# Patient Record
Sex: MALE | Race: WHITE | Employment: OTHER | URBAN - METROPOLITAN AREA
[De-identification: names, ages, dates, MRNs, and addresses within clinical notes are randomized per-mention and may not be internally consistent; named-entity substitution may affect disease eponyms.]

---

## 2021-05-20 ENCOUNTER — OFFICE VISIT (OUTPATIENT)
Dept: INTERNAL MEDICINE CLINIC | Facility: CLINIC | Age: 61
End: 2021-05-20
Payer: COMMERCIAL

## 2021-05-20 VITALS
WEIGHT: 231 LBS | HEIGHT: 71 IN | HEART RATE: 80 BPM | DIASTOLIC BLOOD PRESSURE: 82 MMHG | SYSTOLIC BLOOD PRESSURE: 138 MMHG | BODY MASS INDEX: 32.34 KG/M2 | OXYGEN SATURATION: 98 %

## 2021-05-20 DIAGNOSIS — I10 ESSENTIAL HYPERTENSION: Primary | ICD-10-CM

## 2021-05-20 DIAGNOSIS — Z72.0 TOBACCO ABUSE: ICD-10-CM

## 2021-05-20 DIAGNOSIS — D75.1 ERYTHROCYTOSIS: ICD-10-CM

## 2021-05-20 DIAGNOSIS — R93.3 ABNORMAL COLONOSCOPY: ICD-10-CM

## 2021-05-20 DIAGNOSIS — K57.90 DIVERTICULOSIS: ICD-10-CM

## 2021-05-20 PROCEDURE — 99204 OFFICE O/P NEW MOD 45 MIN: CPT | Performed by: INTERNAL MEDICINE

## 2021-05-20 PROCEDURE — 3079F DIAST BP 80-89 MM HG: CPT | Performed by: INTERNAL MEDICINE

## 2021-05-20 PROCEDURE — 3008F BODY MASS INDEX DOCD: CPT | Performed by: INTERNAL MEDICINE

## 2021-05-20 PROCEDURE — 3075F SYST BP GE 130 - 139MM HG: CPT | Performed by: INTERNAL MEDICINE

## 2021-05-20 RX ORDER — LISINOPRIL 20 MG/1
20 TABLET ORAL DAILY
COMMUNITY
Start: 2020-01-27 | End: 2021-05-20

## 2021-05-20 RX ORDER — LISINOPRIL 20 MG/1
20 TABLET ORAL DAILY
Qty: 90 TABLET | Refills: 0 | Status: SHIPPED | OUTPATIENT
Start: 2021-05-20 | End: 2021-08-16

## 2021-05-20 NOTE — PROGRESS NOTES
HPI:    Patient ID: Theresa Tracey is a 59year old male. Presents to the office for the first time to establish care. Patient recently moved here from [place]. In general he has been feeling fine.     Patient has history of diverticulosis, tobacc • Other (DEAFNESS) Mother    • Diabetes Maternal Grandfather    • Stroke Maternal Grandfather      Social History    Socioeconomic History      Marital status: Life Partner      Spouse name: Not on file      Number of children: Not on file      Years of SWELLING  Sulfa Antibiotics       OTHER (SEE COMMENTS)    Comment:GI UPSET  Hydrochlorothiazide     OTHER (SEE COMMENTS)    Comment:Indigestion after dinner, went away after 2 large             glasses of drinking water   PHYSICAL EXAM:   B exercise in addition to minimizing salt in diet which will also help blood pressure. Patient does have longstanding history of diverticulosis with treatment for diverticulitis in January.   Colonoscopy showed changes consistent with diverticulitis and GI

## 2021-06-28 NOTE — H&P
[facility] - Gastroenterology                                                                                                               Reason for consult: crc screening    Requesting physician or provider: Arnol Pastor MD    Patient presents side effects were discussed. Subsequent abdominal CT scan and repeat colonoscopy in a month to prove normalization of the area after medical treatment were recommended by Dr. Jasmina Syed.     He did not have repeat ct and/or cln    He has had mild abd pain in • Hypertension Mother    • Dementia Mother    • Heart Attack Mother    • Other (DEAFNESS) Mother    • Diabetes Maternal Grandfather    • Stroke Maternal Grandfather       Social History: Social History    Tobacco Use      Smoking status: Current Every Da soft, mild tenderness low abd, non-distended no rebound or guarding, no masses, no hepatomegaly  MSK: No redness, no warmth, no swelling of joints  SKIN: No jaundice, no erythema, no rashes  HEMATOLOGIC: No bleeding, no bruising  NEURO: Alert and interacti 2 polyps actually removed at approximately 20  cm, both of which were small and pedunculated. They appeared  hyperplastic in nature. The scope was then brought back to the rectum  where it was retroflexed. There was no significant lesions.  Digital  rectal low abd pain c/w previous diverticular attacks. No fever/chills. He has switched to low residue diet and is moving bowels daily w/o brbpr, melena. He denies a FHx GI malignancy.   Plan for:  -low residue diet  -report to er if condition decline  -labs  -c

## 2021-06-30 ENCOUNTER — LAB ENCOUNTER (OUTPATIENT)
Dept: LAB | Facility: HOSPITAL | Age: 61
End: 2021-06-30
Attending: NURSE PRACTITIONER
Payer: COMMERCIAL

## 2021-06-30 ENCOUNTER — OFFICE VISIT (OUTPATIENT)
Dept: GASTROENTEROLOGY | Facility: CLINIC | Age: 61
End: 2021-06-30
Payer: COMMERCIAL

## 2021-06-30 ENCOUNTER — TELEPHONE (OUTPATIENT)
Dept: GASTROENTEROLOGY | Facility: CLINIC | Age: 61
End: 2021-06-30

## 2021-06-30 VITALS
BODY MASS INDEX: 32.34 KG/M2 | HEIGHT: 71 IN | DIASTOLIC BLOOD PRESSURE: 103 MMHG | WEIGHT: 231 LBS | HEART RATE: 96 BPM | SYSTOLIC BLOOD PRESSURE: 170 MMHG

## 2021-06-30 DIAGNOSIS — I10 ESSENTIAL HYPERTENSION: ICD-10-CM

## 2021-06-30 DIAGNOSIS — K57.32 SIGMOID DIVERTICULITIS: ICD-10-CM

## 2021-06-30 DIAGNOSIS — Z86.010 PERSONAL HISTORY OF COLONIC POLYPS: ICD-10-CM

## 2021-06-30 DIAGNOSIS — K30 INDIGESTION: ICD-10-CM

## 2021-06-30 DIAGNOSIS — K57.32 SIGMOID DIVERTICULITIS: Primary | ICD-10-CM

## 2021-06-30 LAB
BASOPHILS # BLD AUTO: 0.09 X10(3) UL (ref 0–0.2)
BASOPHILS NFR BLD AUTO: 0.6 %
DEPRECATED RDW RBC AUTO: 44.2 FL (ref 35.1–46.3)
EOSINOPHIL # BLD AUTO: 0.35 X10(3) UL (ref 0–0.7)
EOSINOPHIL NFR BLD AUTO: 2.2 %
ERYTHROCYTE [DISTWIDTH] IN BLOOD BY AUTOMATED COUNT: 13.7 % (ref 11–15)
HCT VFR BLD AUTO: 49.9 %
HGB BLD-MCNC: 16.2 G/DL
IMM GRANULOCYTES # BLD AUTO: 0.11 X10(3) UL (ref 0–1)
IMM GRANULOCYTES NFR BLD: 0.7 %
LYMPHOCYTES # BLD AUTO: 2.59 X10(3) UL (ref 1–4)
LYMPHOCYTES NFR BLD AUTO: 16.1 %
MCH RBC QN AUTO: 28.3 PG (ref 26–34)
MCHC RBC AUTO-ENTMCNC: 32.5 G/DL (ref 31–37)
MCV RBC AUTO: 87.2 FL
MONOCYTES # BLD AUTO: 1.13 X10(3) UL (ref 0.1–1)
MONOCYTES NFR BLD AUTO: 7 %
NEUTROPHILS # BLD AUTO: 11.86 X10 (3) UL (ref 1.5–7.7)
NEUTROPHILS # BLD AUTO: 11.86 X10(3) UL (ref 1.5–7.7)
NEUTROPHILS NFR BLD AUTO: 73.4 %
PLATELET # BLD AUTO: 524 10(3)UL (ref 150–450)
RBC # BLD AUTO: 5.72 X10(6)UL
WBC # BLD AUTO: 16.1 X10(3) UL (ref 4–11)

## 2021-06-30 PROCEDURE — 3077F SYST BP >= 140 MM HG: CPT | Performed by: NURSE PRACTITIONER

## 2021-06-30 PROCEDURE — 3008F BODY MASS INDEX DOCD: CPT | Performed by: NURSE PRACTITIONER

## 2021-06-30 PROCEDURE — 36415 COLL VENOUS BLD VENIPUNCTURE: CPT

## 2021-06-30 PROCEDURE — 85025 COMPLETE CBC W/AUTO DIFF WBC: CPT

## 2021-06-30 PROCEDURE — 99244 OFF/OP CNSLTJ NEW/EST MOD 40: CPT | Performed by: NURSE PRACTITIONER

## 2021-06-30 PROCEDURE — 3080F DIAST BP >= 90 MM HG: CPT | Performed by: NURSE PRACTITIONER

## 2021-06-30 RX ORDER — METRONIDAZOLE 500 MG/1
500 TABLET ORAL EVERY 8 HOURS
Qty: 21 TABLET | Refills: 0 | Status: SHIPPED | OUTPATIENT
Start: 2021-06-30 | End: 2021-07-07

## 2021-06-30 RX ORDER — CIPROFLOXACIN 500 MG/1
500 TABLET, FILM COATED ORAL 2 TIMES DAILY
Qty: 14 TABLET | Refills: 0 | Status: SHIPPED | OUTPATIENT
Start: 2021-06-30 | End: 2021-07-07

## 2021-06-30 NOTE — PATIENT INSTRUCTIONS
-low residue diet  -report to er if condition decline  -labs  -ct  -plan for cln timing on dependent on ct results

## 2021-06-30 NOTE — TELEPHONE ENCOUNTER
Patient called in stating he just wanted to let Roxy Balderrama know that he got the message and he wants to say thank you!

## 2021-07-07 ENCOUNTER — HOSPITAL ENCOUNTER (OUTPATIENT)
Dept: CT IMAGING | Facility: HOSPITAL | Age: 61
Discharge: HOME | End: 2021-07-07
Attending: NURSE PRACTITIONER
Payer: COMMERCIAL

## 2021-07-07 DIAGNOSIS — K57.32 SIGMOID DIVERTICULITIS: ICD-10-CM

## 2021-07-07 LAB — CREAT BLD-MCNC: 1 MG/DL

## 2021-07-07 PROCEDURE — 74177 CT ABD & PELVIS W/CONTRAST: CPT | Performed by: NURSE PRACTITIONER

## 2021-07-07 PROCEDURE — 82565 ASSAY OF CREATININE: CPT

## 2021-07-08 ENCOUNTER — TELEPHONE (OUTPATIENT)
Dept: GASTROENTEROLOGY | Facility: CLINIC | Age: 61
End: 2021-07-08

## 2021-07-08 ENCOUNTER — HOSPITAL ENCOUNTER (INPATIENT)
Facility: HOSPITAL | Age: 61
LOS: 2 days | Discharge: HOME | DRG: 392 | End: 2021-07-10
Attending: EMERGENCY MEDICINE | Admitting: INTERNAL MEDICINE
Payer: COMMERCIAL

## 2021-07-08 DIAGNOSIS — K57.20 COLONIC DIVERTICULAR ABSCESS: Primary | ICD-10-CM

## 2021-07-08 PROBLEM — R79.89 AZOTEMIA: Status: ACTIVE | Noted: 2021-07-08

## 2021-07-08 PROBLEM — K57.92 DIVERTICULITIS: Status: ACTIVE | Noted: 2021-07-08

## 2021-07-08 LAB
ANION GAP SERPL CALC-SCNC: 5 MMOL/L (ref 0–18)
BASOPHILS # BLD AUTO: 0.09 X10(3) UL (ref 0–0.2)
BASOPHILS NFR BLD AUTO: 0.7 %
BUN BLD-MCNC: 16 MG/DL (ref 7–18)
BUN/CREAT SERPL: 20.3 (ref 10–20)
CALCIUM BLD-MCNC: 8.8 MG/DL (ref 8.5–10.1)
CHLORIDE SERPL-SCNC: 107 MMOL/L (ref 98–112)
CO2 SERPL-SCNC: 28 MMOL/L (ref 21–32)
CREAT BLD-MCNC: 0.79 MG/DL
DEPRECATED RDW RBC AUTO: 42.6 FL (ref 35.1–46.3)
EOSINOPHIL # BLD AUTO: 0.45 X10(3) UL (ref 0–0.7)
EOSINOPHIL NFR BLD AUTO: 3.3 %
ERYTHROCYTE [DISTWIDTH] IN BLOOD BY AUTOMATED COUNT: 13.7 % (ref 11–15)
GLUCOSE BLD-MCNC: 94 MG/DL (ref 70–99)
HCT VFR BLD AUTO: 45.8 %
HGB BLD-MCNC: 15 G/DL
IMM GRANULOCYTES # BLD AUTO: 0.14 X10(3) UL (ref 0–1)
IMM GRANULOCYTES NFR BLD: 1 %
LYMPHOCYTES # BLD AUTO: 3.41 X10(3) UL (ref 1–4)
LYMPHOCYTES NFR BLD AUTO: 25.1 %
MCH RBC QN AUTO: 27.9 PG (ref 26–34)
MCHC RBC AUTO-ENTMCNC: 32.8 G/DL (ref 31–37)
MCV RBC AUTO: 85.1 FL
MONOCYTES # BLD AUTO: 0.99 X10(3) UL (ref 0.1–1)
MONOCYTES NFR BLD AUTO: 7.3 %
NEUTROPHILS # BLD AUTO: 8.52 X10 (3) UL (ref 1.5–7.7)
NEUTROPHILS # BLD AUTO: 8.52 X10(3) UL (ref 1.5–7.7)
NEUTROPHILS NFR BLD AUTO: 62.6 %
OSMOLALITY SERPL CALC.SUM OF ELEC: 291 MOSM/KG (ref 275–295)
PLATELET # BLD AUTO: 546 10(3)UL (ref 150–450)
POTASSIUM SERPL-SCNC: 4.4 MMOL/L (ref 3.5–5.1)
RBC # BLD AUTO: 5.38 X10(6)UL
SODIUM SERPL-SCNC: 140 MMOL/L (ref 136–145)
WBC # BLD AUTO: 13.6 X10(3) UL (ref 4–11)

## 2021-07-08 PROCEDURE — 99254 IP/OBS CNSLTJ NEW/EST MOD 60: CPT | Performed by: PHYSICIAN ASSISTANT

## 2021-07-08 RX ORDER — ONDANSETRON 2 MG/ML
4 INJECTION INTRAMUSCULAR; INTRAVENOUS EVERY 6 HOURS PRN
Status: DISCONTINUED | OUTPATIENT
Start: 2021-07-08 | End: 2021-07-10

## 2021-07-08 RX ORDER — MORPHINE SULFATE 2 MG/ML
2 INJECTION, SOLUTION INTRAMUSCULAR; INTRAVENOUS EVERY 2 HOUR PRN
Status: DISCONTINUED | OUTPATIENT
Start: 2021-07-08 | End: 2021-07-10

## 2021-07-08 RX ORDER — MELATONIN
3 NIGHTLY PRN
Status: DISCONTINUED | OUTPATIENT
Start: 2021-07-08 | End: 2021-07-10

## 2021-07-08 RX ORDER — PROCHLORPERAZINE EDISYLATE 5 MG/ML
5 INJECTION INTRAMUSCULAR; INTRAVENOUS EVERY 8 HOURS PRN
Status: DISCONTINUED | OUTPATIENT
Start: 2021-07-08 | End: 2021-07-10

## 2021-07-08 RX ORDER — HYDROCODONE BITARTRATE AND ACETAMINOPHEN 5; 325 MG/1; MG/1
1 TABLET ORAL EVERY 4 HOURS PRN
Status: DISCONTINUED | OUTPATIENT
Start: 2021-07-08 | End: 2021-07-10

## 2021-07-08 RX ORDER — LISINOPRIL 20 MG/1
20 TABLET ORAL NIGHTLY
Status: DISCONTINUED | OUTPATIENT
Start: 2021-07-08 | End: 2021-07-10

## 2021-07-08 RX ORDER — DEXTROSE, SODIUM CHLORIDE, AND POTASSIUM CHLORIDE 5; .45; .15 G/100ML; G/100ML; G/100ML
INJECTION INTRAVENOUS CONTINUOUS
Status: DISCONTINUED | OUTPATIENT
Start: 2021-07-08 | End: 2021-07-10

## 2021-07-08 RX ORDER — HYDROCODONE BITARTRATE AND ACETAMINOPHEN 5; 325 MG/1; MG/1
2 TABLET ORAL EVERY 4 HOURS PRN
Status: DISCONTINUED | OUTPATIENT
Start: 2021-07-08 | End: 2021-07-10

## 2021-07-08 RX ORDER — MORPHINE SULFATE 2 MG/ML
1 INJECTION, SOLUTION INTRAMUSCULAR; INTRAVENOUS EVERY 2 HOUR PRN
Status: DISCONTINUED | OUTPATIENT
Start: 2021-07-08 | End: 2021-07-10

## 2021-07-08 RX ORDER — ACETAMINOPHEN 325 MG/1
650 TABLET ORAL EVERY 4 HOURS PRN
Status: DISCONTINUED | OUTPATIENT
Start: 2021-07-08 | End: 2021-07-10

## 2021-07-08 RX ORDER — MORPHINE SULFATE 4 MG/ML
4 INJECTION, SOLUTION INTRAMUSCULAR; INTRAVENOUS EVERY 2 HOUR PRN
Status: DISCONTINUED | OUTPATIENT
Start: 2021-07-08 | End: 2021-07-10

## 2021-07-08 RX ORDER — SODIUM CHLORIDE 9 MG/ML
INJECTION, SOLUTION INTRAVENOUS CONTINUOUS
Status: ACTIVE | OUTPATIENT
Start: 2021-07-08 | End: 2021-07-08

## 2021-07-08 NOTE — CONSULTS
[facility]   Gastroenterology Consultation Note    Solo Lundberg  Patient Status:    Emergency  Date of Admission:         7/8/2021, Hospital day #0  Attending:   Ric Ashley MD  PCP:     Wm Torres MD    Reason for Cons pathologies    Endoscopy Hx:  - CLN 1/2010: 2 polyps removed and path c/w pre-cancerous type. CLN 1/29/20:    PROCEDURE: Colonoscopy with polypectomy. SURGEON: Rambo Mesa.  Dave Wilkins MD    ANESTHESIA: Sedation, analgesia with 100 mg of Demerol, 5 mg of V There was no significant lesions. Digital  rectal exam was done and revealed good sphincter tone. No masses or  abnormalities palpated. FOLLOW-UP: He will follow-up next week to get the biopsy results. Fern Ybarra MD     DIAGNOSIS           \" prior EGD    Social Hx:  - reports that he quit tobacco use ten years ago however per notes, currently smoking 1 pack per week   - No ETOH reported to me, however previous reports indicate 3 glasses of wine/week  - Denies illicit drug use  - Lives with par dysuria or gross hematuria  INTEGUMENT/BREAST:  SKIN:  negative for jaundice or new rash   ALLERGIC/IMMUNOLOGIC:  negative for hay fever   ENDOCRINE:  negative for cold intolerance and heat intolerance  MUSCULOSKELETAL:  negative for joint effusion/severe the presence of this fluid collection and diverticulitis rather than primary appendicitis. Segments of the distal ileum also are in contact with this region of fluid.   2.  Fat density focus in the distal duodenum which could represent incidental luminal c

## 2021-07-08 NOTE — ED PROVIDER NOTES
Patient Seen in: [facility] Emergency Department      History   Patient presents with:  Diverticulitis  Abnormal Result    Stated Complaint: iv abx for diverticulitis     HPI/Subjective:   HPI    Patient presents to the emergency department with a 10. 98\")   Wt 104.8 kg   SpO2 98%   BMI 32.24 kg/m²         Physical Exam  Vitals and nursing note reviewed. Constitutional:       General: He is not in acute distress. Appearance: He is well-developed. HENT:      Head: Normocephalic.    Eyes: RAINBOW DRAW LAVENDER   RAINBOW DRAW LIGHT GREEN   RAINBOW DRAW GOLD                   MDM      Pulse Ox: 98%, Normal,       Radiology findings: CT reviewed from yesterday.   Radiology exams  Viewed and reviewed by myself and findings discussed with randa

## 2021-07-08 NOTE — ED INITIAL ASSESSMENT (HPI)
On abx for diverticulitis x 10 days with continued pain had OP CT with possible early abscess formation.

## 2021-07-08 NOTE — TELEPHONE ENCOUNTER
CT A/P 7/7/21:  Impression  CONCLUSION:   1.  There is history of sigmoid diverticulitis.  No comparison imaging is available at this institution. Costa Cox is a 12 cm segment of inflamed mid-distal sigmoid colon, compatible with ongoing diverticulitis. Merritt Ruiz Hyperplastic polyp. B) Colon, descending, biopsy: Benign colonic mucosa, submucosal tissue not seen. C) Colon, sigmoid, biopsy: Benign colonic mucosa with a lymphoid aggregate.  D) Colon, sigmoid, biopsies: Benign colonic mucosa with active chronic inflamma

## 2021-07-08 NOTE — ED QUICK NOTES
Orders for admission, patient is aware of plan and ready to go upstairs.  Any questions, please call ED RN Rafe Dakins  at extension [phone]  Type of COVID test sent:None-pt vaccinated  COVID Suspicion level: Low    Titratable drug(s) infusing: SL  Rate:    LOC at

## 2021-07-09 LAB
ALBUMIN SERPL-MCNC: 2 G/DL (ref 3.4–5)
ALBUMIN/GLOB SERPL: 0.5 {RATIO} (ref 1–2)
ALP LIVER SERPL-CCNC: 70 U/L
ALT SERPL-CCNC: 28 U/L
ANION GAP SERPL CALC-SCNC: 5 MMOL/L (ref 0–18)
AST SERPL-CCNC: 20 U/L (ref 15–37)
BASOPHILS # BLD AUTO: 0.09 X10(3) UL (ref 0–0.2)
BASOPHILS NFR BLD AUTO: 0.8 %
BILIRUB SERPL-MCNC: 0.2 MG/DL (ref 0.1–2)
BUN BLD-MCNC: 14 MG/DL (ref 7–18)
BUN/CREAT SERPL: 17.7 (ref 10–20)
CALCIUM BLD-MCNC: 8.7 MG/DL (ref 8.5–10.1)
CHLORIDE SERPL-SCNC: 108 MMOL/L (ref 98–112)
CO2 SERPL-SCNC: 27 MMOL/L (ref 21–32)
CREAT BLD-MCNC: 0.79 MG/DL
DEPRECATED RDW RBC AUTO: 42.6 FL (ref 35.1–46.3)
EOSINOPHIL # BLD AUTO: 0.47 X10(3) UL (ref 0–0.7)
EOSINOPHIL NFR BLD AUTO: 4.4 %
ERYTHROCYTE [DISTWIDTH] IN BLOOD BY AUTOMATED COUNT: 13.5 % (ref 11–15)
GLOBULIN PLAS-MCNC: 4.1 G/DL (ref 2.8–4.4)
GLUCOSE BLD-MCNC: 116 MG/DL (ref 70–99)
HCT VFR BLD AUTO: 43.6 %
HGB BLD-MCNC: 14.2 G/DL
IMM GRANULOCYTES # BLD AUTO: 0.07 X10(3) UL (ref 0–1)
IMM GRANULOCYTES NFR BLD: 0.7 %
LYMPHOCYTES # BLD AUTO: 2.37 X10(3) UL (ref 1–4)
LYMPHOCYTES NFR BLD AUTO: 22.4 %
M PROTEIN MFR SERPL ELPH: 6.1 G/DL (ref 6.4–8.2)
MCH RBC QN AUTO: 28 PG (ref 26–34)
MCHC RBC AUTO-ENTMCNC: 32.6 G/DL (ref 31–37)
MCV RBC AUTO: 85.8 FL
MONOCYTES # BLD AUTO: 0.85 X10(3) UL (ref 0.1–1)
MONOCYTES NFR BLD AUTO: 8 %
NEUTROPHILS # BLD AUTO: 6.74 X10 (3) UL (ref 1.5–7.7)
NEUTROPHILS # BLD AUTO: 6.74 X10(3) UL (ref 1.5–7.7)
NEUTROPHILS NFR BLD AUTO: 63.7 %
OSMOLALITY SERPL CALC.SUM OF ELEC: 291 MOSM/KG (ref 275–295)
PLATELET # BLD AUTO: 467 10(3)UL (ref 150–450)
POTASSIUM SERPL-SCNC: 4.6 MMOL/L (ref 3.5–5.1)
RBC # BLD AUTO: 5.08 X10(6)UL
SODIUM SERPL-SCNC: 140 MMOL/L (ref 136–145)
WBC # BLD AUTO: 10.6 X10(3) UL (ref 4–11)

## 2021-07-09 PROCEDURE — 99233 SBSQ HOSP IP/OBS HIGH 50: CPT | Performed by: INTERNAL MEDICINE

## 2021-07-09 PROCEDURE — 99222 1ST HOSP IP/OBS MODERATE 55: CPT | Performed by: INTERNAL MEDICINE

## 2021-07-09 RX ORDER — AMLODIPINE BESYLATE 5 MG/1
5 TABLET ORAL DAILY
Status: DISCONTINUED | OUTPATIENT
Start: 2021-07-09 | End: 2021-07-10

## 2021-07-09 NOTE — H&P
[facility]    History and Physical    Ruthie Maza Patient Status:  Inpatient    1960 MRN [id number]   Location [facility] 4W/SW/SE Attending Oneida Rubinstein, MD   Hosp Day # 1 PCP Leif Narayanan MD     Date:  2021 • Heart Disorder Father    • Hypertension Father    • Other (CAD) Father    • Hypertension Mother    • Dementia Mother    • Heart Attack Mother    • Other (DEAFNESS) Mother    • Diabetes Maternal Grandfather    • Stroke Maternal Grandfather      Social H Constitutional:       Appearance: He is not ill-appearing. HENT:      Head: Normocephalic. Nose: No congestion. Eyes:      Extraocular Movements: Extraocular movements intact. Cardiovascular:      Rate and Rhythm: Normal rate.       Heart sound suggesting phlegmonous change and/or early abscess formation. This could also represent site of a contained perforation.   The tip of the appendix is also adjacent to this region of fluid, and the tip of the appendix is inflamed which is likely secondary t above    Patient understands and agrees with plan.         Robe Chacko MD  7/9/2021

## 2021-07-09 NOTE — IMAGING NOTE
CT scan reviewed - sigmoid diverticulitis with a small area of phlegmonous fluid which is not amenable to drain placement.

## 2021-07-09 NOTE — PLAN OF CARE
Problem: Patient Centered Care  Goal: Patient preferences are identified and integrated in the patient's plan of care  Description: Interventions:  - What would you like us to know as we care for you?   - Provide timely, complete, and accurate informatio deficits and behaviors that affect risk of falls.   - [place] fall precautions as indicated by assessment.  - Educate pt/family on patient safety including physical limitations  - Instruct pt to call for assistance with activity based on assessment  - Mod

## 2021-07-09 NOTE — PROGRESS NOTES
[facility]     Gastroenterology Progress Note    Jose Hayes Patient Status:  Inpatient    1960 MRN [id number]   Location [facility] 4W/SW/SE Attending Magnolia Vaz MD   Hosp Day # 1 PCP Zully Gutierrez MD antibiotics until CT demonstrates resolution of abscess/phlegmon  -- last colonoscopy 4/2021 at [facility], complete to cecum per report. Demonstrated area of diverticulitis in sigmoid which likely correlates to recurrent findings.  No intraluminal maligna

## 2021-07-10 VITALS
BODY MASS INDEX: 31.36 KG/M2 | SYSTOLIC BLOOD PRESSURE: 172 MMHG | HEIGHT: 71 IN | OXYGEN SATURATION: 96 % | RESPIRATION RATE: 20 BRPM | DIASTOLIC BLOOD PRESSURE: 96 MMHG | HEART RATE: 76 BPM | WEIGHT: 224 LBS | TEMPERATURE: 98 F

## 2021-07-10 LAB
ANION GAP SERPL CALC-SCNC: 3 MMOL/L (ref 0–18)
BASOPHILS # BLD AUTO: 0.08 X10(3) UL (ref 0–0.2)
BASOPHILS NFR BLD AUTO: 0.7 %
BUN BLD-MCNC: 9 MG/DL (ref 7–18)
BUN/CREAT SERPL: 11 (ref 10–20)
CALCIUM BLD-MCNC: 9.3 MG/DL (ref 8.5–10.1)
CHLORIDE SERPL-SCNC: 106 MMOL/L (ref 98–112)
CO2 SERPL-SCNC: 29 MMOL/L (ref 21–32)
CREAT BLD-MCNC: 0.82 MG/DL
DEPRECATED RDW RBC AUTO: 43.1 FL (ref 35.1–46.3)
EOSINOPHIL # BLD AUTO: 0.4 X10(3) UL (ref 0–0.7)
EOSINOPHIL NFR BLD AUTO: 3.7 %
ERYTHROCYTE [DISTWIDTH] IN BLOOD BY AUTOMATED COUNT: 13.6 % (ref 11–15)
GLUCOSE BLD-MCNC: 113 MG/DL (ref 70–99)
HCT VFR BLD AUTO: 48.2 %
HGB BLD-MCNC: 15.8 G/DL
IMM GRANULOCYTES # BLD AUTO: 0.09 X10(3) UL (ref 0–1)
IMM GRANULOCYTES NFR BLD: 0.8 %
LYMPHOCYTES # BLD AUTO: 2.27 X10(3) UL (ref 1–4)
LYMPHOCYTES NFR BLD AUTO: 20.9 %
MCH RBC QN AUTO: 28.3 PG (ref 26–34)
MCHC RBC AUTO-ENTMCNC: 32.8 G/DL (ref 31–37)
MCV RBC AUTO: 86.2 FL
MONOCYTES # BLD AUTO: 0.69 X10(3) UL (ref 0.1–1)
MONOCYTES NFR BLD AUTO: 6.4 %
NEUTROPHILS # BLD AUTO: 7.32 X10 (3) UL (ref 1.5–7.7)
NEUTROPHILS # BLD AUTO: 7.32 X10(3) UL (ref 1.5–7.7)
NEUTROPHILS NFR BLD AUTO: 67.5 %
OSMOLALITY SERPL CALC.SUM OF ELEC: 285 MOSM/KG (ref 275–295)
PLATELET # BLD AUTO: 538 10(3)UL (ref 150–450)
POTASSIUM SERPL-SCNC: 4.6 MMOL/L (ref 3.5–5.1)
RBC # BLD AUTO: 5.59 X10(6)UL
SODIUM SERPL-SCNC: 138 MMOL/L (ref 136–145)
WBC # BLD AUTO: 10.9 X10(3) UL (ref 4–11)

## 2021-07-10 PROCEDURE — 99232 SBSQ HOSP IP/OBS MODERATE 35: CPT | Performed by: INTERNAL MEDICINE

## 2021-07-10 PROCEDURE — 99239 HOSP IP/OBS DSCHRG MGMT >30: CPT | Performed by: INTERNAL MEDICINE

## 2021-07-10 RX ORDER — AMLODIPINE BESYLATE 10 MG/1
10 TABLET ORAL DAILY
Qty: 30 TABLET | Refills: 6 | Status: SHIPPED | OUTPATIENT
Start: 2021-07-11 | End: 2022-01-30

## 2021-07-10 RX ORDER — AMLODIPINE BESYLATE 10 MG/1
10 TABLET ORAL DAILY
Status: DISCONTINUED | OUTPATIENT
Start: 2021-07-10 | End: 2021-07-10

## 2021-07-10 RX ORDER — AMOXICILLIN AND CLAVULANATE POTASSIUM 875; 125 MG/1; MG/1
875 TABLET, FILM COATED ORAL EVERY 12 HOURS SCHEDULED
Qty: 42 TABLET | Refills: 0 | Status: SHIPPED | OUTPATIENT
Start: 2021-07-10 | End: 2021-07-31

## 2021-07-10 RX ORDER — AMOXICILLIN AND CLAVULANATE POTASSIUM 875; 125 MG/1; MG/1
875 TABLET, FILM COATED ORAL EVERY 12 HOURS SCHEDULED
Status: DISCONTINUED | OUTPATIENT
Start: 2021-07-10 | End: 2021-07-10

## 2021-07-10 NOTE — PROGRESS NOTES
[facility]    Progress Note    Syed Lindo Patient Status:  Inpatient    1960 MRN [id number]   Location [facility] 4W/SW/SE Attending Dearanaid Mcguire MD   Hosp Day # 2 PCP Ivonne Bee MD     Subjective:   Carlo Kelly improved, will plan on discharge home later today. Patient will follow up with me in about 5 days at which time we will recheck CBC and arrange for follow-up CT and surgical evaluation  We will also reassess blood pressure at that time.     Medications per

## 2021-07-10 NOTE — PLAN OF CARE
Plan of care reviewed with Vipul Gutierrez. Patient denies pain. IV antibiotics infusing. Ambulating independently in hallway and room. Blood pressure elevated after norvasc was started this afternoon.  Dr. Jenna House made aware, and order to give lisinopril early this

## 2021-07-10 NOTE — DISCHARGE SUMMARY
[facility]    Discharge Summary    Veena Howard Patient Status:  Inpatient    1960 MRN [id number]   Location [facility] 4W/SW/SE Attending Tucker Cisse MD   Hosp Day # 2 PCP Migdalia Calabrese MD     Date of Admission: pain      Discharge Condition: Stable         Discharge Medications:      Discharge Medications      START taking these medications      Instructions Prescription details   amLODIPine Besylate 10 MG Tabs  Commonly known as: NORVASC  Start taking on: July 1

## 2021-07-10 NOTE — PLAN OF CARE
Problem: Patient Centered Care  Goal: Patient preferences are identified and integrated in the patient's plan of care  Description: Interventions:  - What would you like us to know as we care for you?   - Provide timely, complete, and accurate informatio affect risk of falls.   - [place] fall precautions as indicated by assessment.  - Educate pt/family on patient safety including physical limitations  - Instruct pt to call for assistance with activity based on assessment  - Modify environment to reduce ri

## 2021-07-10 NOTE — PROGRESS NOTES
[facility]     Gastroenterology Progress Note    Carlitos Souza Patient Status:  Inpatient    1960 MRN [id number]   Location [facility] 4W/SW/SE Attending Austin Mayes MD   Hosp Day # 2 PCP Jaz Quiroga MD cecum per report. Demonstrated area of diverticulitis in sigmoid which likely correlates to recurrent findings. No intraluminal malignancy seen. No plan to repeat c-scope at this time.        Results:     Lab Results   Component Value Date    WBC 10.9 07/1

## 2021-07-10 NOTE — PLAN OF CARE
Patient alert and oriented, vital signs stable on room air, ambulating independently, voiding freely, passing gas, tolerating diet.  Patient cleared for discharge, received last dose of Merrem and first dose of Augmentin prior to leaving to insure no allerg pre-medicate as appropriate  Outcome: Adequate for Discharge     Problem: SAFETY ADULT - FALL  Goal: Free from fall injury  Description: INTERVENTIONS:  - Assess pt frequently for physical needs  - Identify cognitive and physical deficits and behaviors gema

## 2021-07-15 ENCOUNTER — OFFICE VISIT (OUTPATIENT)
Dept: INTERNAL MEDICINE CLINIC | Facility: CLINIC | Age: 61
End: 2021-07-15
Payer: COMMERCIAL

## 2021-07-15 ENCOUNTER — LAB ENCOUNTER (OUTPATIENT)
Dept: LAB | Age: 61
End: 2021-07-15
Attending: INTERNAL MEDICINE
Payer: COMMERCIAL

## 2021-07-15 VITALS
OXYGEN SATURATION: 98 % | DIASTOLIC BLOOD PRESSURE: 88 MMHG | SYSTOLIC BLOOD PRESSURE: 132 MMHG | BODY MASS INDEX: 31.78 KG/M2 | HEART RATE: 88 BPM | WEIGHT: 227 LBS | TEMPERATURE: 99 F | HEIGHT: 71 IN

## 2021-07-15 DIAGNOSIS — K57.92 ACUTE DIVERTICULITIS: Primary | ICD-10-CM

## 2021-07-15 DIAGNOSIS — K57.92 ACUTE DIVERTICULITIS: ICD-10-CM

## 2021-07-15 DIAGNOSIS — I10 ESSENTIAL HYPERTENSION: ICD-10-CM

## 2021-07-15 LAB
ANION GAP SERPL CALC-SCNC: 4 MMOL/L (ref 0–18)
BASOPHILS # BLD AUTO: 0.14 X10(3) UL (ref 0–0.2)
BASOPHILS NFR BLD AUTO: 1.3 %
BUN BLD-MCNC: 14 MG/DL (ref 7–18)
BUN/CREAT SERPL: 17.5 (ref 10–20)
CALCIUM BLD-MCNC: 9.5 MG/DL (ref 8.5–10.1)
CHLORIDE SERPL-SCNC: 107 MMOL/L (ref 98–112)
CO2 SERPL-SCNC: 27 MMOL/L (ref 21–32)
CREAT BLD-MCNC: 0.8 MG/DL
DEPRECATED RDW RBC AUTO: 45.2 FL (ref 35.1–46.3)
EOSINOPHIL # BLD AUTO: 0.25 X10(3) UL (ref 0–0.7)
EOSINOPHIL NFR BLD AUTO: 2.3 %
ERYTHROCYTE [DISTWIDTH] IN BLOOD BY AUTOMATED COUNT: 14.2 % (ref 11–15)
GLUCOSE BLD-MCNC: 78 MG/DL (ref 70–99)
HCT VFR BLD AUTO: 51.9 %
HGB BLD-MCNC: 16.7 G/DL
IMM GRANULOCYTES # BLD AUTO: 0.07 X10(3) UL (ref 0–1)
IMM GRANULOCYTES NFR BLD: 0.6 %
LYMPHOCYTES # BLD AUTO: 2.52 X10(3) UL (ref 1–4)
LYMPHOCYTES NFR BLD AUTO: 23.2 %
MCH RBC QN AUTO: 27.9 PG (ref 26–34)
MCHC RBC AUTO-ENTMCNC: 32.2 G/DL (ref 31–37)
MCV RBC AUTO: 86.6 FL
MONOCYTES # BLD AUTO: 0.71 X10(3) UL (ref 0.1–1)
MONOCYTES NFR BLD AUTO: 6.5 %
NEUTROPHILS # BLD AUTO: 7.19 X10 (3) UL (ref 1.5–7.7)
NEUTROPHILS # BLD AUTO: 7.19 X10(3) UL (ref 1.5–7.7)
NEUTROPHILS NFR BLD AUTO: 66.1 %
OSMOLALITY SERPL CALC.SUM OF ELEC: 285 MOSM/KG (ref 275–295)
PATIENT FASTING Y/N/NP: NO
PLATELET # BLD AUTO: 474 10(3)UL (ref 150–450)
POTASSIUM SERPL-SCNC: 4.9 MMOL/L (ref 3.5–5.1)
RBC # BLD AUTO: 5.99 X10(6)UL
SODIUM SERPL-SCNC: 138 MMOL/L (ref 136–145)
WBC # BLD AUTO: 10.9 X10(3) UL (ref 4–11)

## 2021-07-15 PROCEDURE — 3079F DIAST BP 80-89 MM HG: CPT | Performed by: INTERNAL MEDICINE

## 2021-07-15 PROCEDURE — 99214 OFFICE O/P EST MOD 30 MIN: CPT | Performed by: INTERNAL MEDICINE

## 2021-07-15 PROCEDURE — 85025 COMPLETE CBC W/AUTO DIFF WBC: CPT

## 2021-07-15 PROCEDURE — 3075F SYST BP GE 130 - 139MM HG: CPT | Performed by: INTERNAL MEDICINE

## 2021-07-15 PROCEDURE — 3008F BODY MASS INDEX DOCD: CPT | Performed by: INTERNAL MEDICINE

## 2021-07-15 PROCEDURE — 80048 BASIC METABOLIC PNL TOTAL CA: CPT

## 2021-07-15 PROCEDURE — 36415 COLL VENOUS BLD VENIPUNCTURE: CPT

## 2021-07-15 NOTE — PROGRESS NOTES
HPI:    Patient ID: Cheryl Shah is a 59year old male. Presents for hospital f/u. Patient admitted to [place] on July 8 and discharged July 10 after being admitted for further evaluation of acute diverticulitis.   Patient had initially been placed abdominal distention, abdominal pain, blood in stool, constipation, diarrhea and nausea. Genitourinary: Negative for dysuria. Neurological: Negative for dizziness, light-headedness and headaches. Psychiatric/Behavioral: Negative for agitation.  The pa Psychiatric:         Mood and Affect: Mood normal.                ASSESSMENT/PLAN:   Acute diverticulitis  (primary encounter diagnosis)  Essential hypertension    Previous hospitalization and diagnostic test results reviewed and discussed with patient.

## 2021-07-29 ENCOUNTER — HOSPITAL ENCOUNTER (OUTPATIENT)
Dept: CT IMAGING | Facility: HOSPITAL | Age: 61
Discharge: HOME | End: 2021-07-29
Attending: INTERNAL MEDICINE
Payer: COMMERCIAL

## 2021-07-29 DIAGNOSIS — K57.92 ACUTE DIVERTICULITIS: ICD-10-CM

## 2021-07-29 PROCEDURE — 74177 CT ABD & PELVIS W/CONTRAST: CPT | Performed by: INTERNAL MEDICINE

## 2021-08-16 RX ORDER — LISINOPRIL 20 MG/1
TABLET ORAL
Qty: 90 TABLET | Refills: 3 | Status: SHIPPED | OUTPATIENT
Start: 2021-08-16

## 2021-08-24 ENCOUNTER — HOSPITAL ENCOUNTER (OUTPATIENT)
Dept: CT IMAGING | Facility: HOSPITAL | Age: 61
Discharge: HOME | End: 2021-08-24
Attending: INTERNAL MEDICINE
Payer: COMMERCIAL

## 2021-08-24 DIAGNOSIS — R91.1 LUNG NODULE: ICD-10-CM

## 2021-08-24 LAB — CREAT BLD-MCNC: 0.9 MG/DL

## 2021-08-24 PROCEDURE — 82565 ASSAY OF CREATININE: CPT

## 2021-08-24 PROCEDURE — 71260 CT THORAX DX C+: CPT | Performed by: INTERNAL MEDICINE

## 2021-08-25 ENCOUNTER — PATIENT MESSAGE (OUTPATIENT)
Dept: INTERNAL MEDICINE CLINIC | Facility: CLINIC | Age: 61
End: 2021-08-25

## 2021-08-26 NOTE — TELEPHONE ENCOUNTER
From: Jose Hayes  To: Zully Gutierrez MD  Sent: 8/25/2021 8:12 PM CDT  Subject: Test Results Question    Dr. Libby aVzquez: Thank you for not only the update on my lung scan, but for the overall care you continue to provide me.     I will schedule the PET

## 2021-08-31 NOTE — TELEPHONE ENCOUNTER
PET scan was ordered 8/25/21. I confirmed with [facility] that authorization is needed. mychart sent.

## 2021-08-31 NOTE — TELEPHONE ENCOUNTER
Pt is going to call [facility] scheduling for a Pet Scan  Does this need to be authorized through his insurance?   Tasked to nursing

## 2021-09-10 NOTE — TELEPHONE ENCOUNTER
Good AFternoon Dr James Selby and staff,    PET Scan has been authorized on patient behalf.     I will contact patient to advise approved & that he can schedule test.

## 2021-09-16 ENCOUNTER — TELEPHONE (OUTPATIENT)
Dept: INTERNAL MEDICINE CLINIC | Facility: CLINIC | Age: 61
End: 2021-09-16

## 2021-09-16 NOTE — TELEPHONE ENCOUNTER
To Dr. Clint Bryant to please advise. .. Guadalupe Riedel PET scan total body dx: lung nodule scheduled for 9/30/21.

## 2021-09-16 NOTE — TELEPHONE ENCOUNTER
Pt. Is calling to clarify what type of PET scan is being done. initially he was supposed to have a lung PET scan when he checked in it showed up as a brain PET scan, then a whole body PET scan showed up in his mychart . Pt.  Rescheduled for  9/30 he doesn'

## 2021-09-30 ENCOUNTER — HOSPITAL ENCOUNTER (OUTPATIENT)
Dept: NUCLEAR MEDICINE | Facility: HOSPITAL | Age: 61
Discharge: HOME | End: 2021-09-30
Attending: INTERNAL MEDICINE
Payer: COMMERCIAL

## 2021-09-30 ENCOUNTER — HOSPITAL ENCOUNTER (OUTPATIENT)
Dept: NUCLEAR MEDICINE | Facility: HOSPITAL | Age: 61
End: 2021-09-30
Attending: INTERNAL MEDICINE
Payer: COMMERCIAL

## 2021-09-30 DIAGNOSIS — R91.1 LUNG NODULE: ICD-10-CM

## 2021-09-30 PROCEDURE — 82962 GLUCOSE BLOOD TEST: CPT

## 2021-09-30 PROCEDURE — 78815 PET IMAGE W/CT SKULL-THIGH: CPT | Performed by: INTERNAL MEDICINE

## 2022-01-30 RX ORDER — AMLODIPINE BESYLATE 10 MG/1
TABLET ORAL
Qty: 90 TABLET | Refills: 1 | Status: SHIPPED | OUTPATIENT
Start: 2022-01-30

## 2022-01-30 NOTE — TELEPHONE ENCOUNTER
Requested Prescriptions     Signed Prescriptions Disp Refills   • AMLODIPINE 10 MG Oral Tab 90 tablet 1     Sig: TAKE 1 TABLET(10 MG) BY MOUTH DAILY     Authorizing Provider: Lefty Hernandez     Ordering User: Prem Antunez

## 2022-02-09 ENCOUNTER — OFFICE VISIT (OUTPATIENT)
Dept: INTERNAL MEDICINE CLINIC | Facility: CLINIC | Age: 62
End: 2022-02-09
Payer: COMMERCIAL

## 2022-02-09 ENCOUNTER — LAB ENCOUNTER (OUTPATIENT)
Dept: LAB | Age: 62
End: 2022-02-09
Attending: INTERNAL MEDICINE
Payer: COMMERCIAL

## 2022-02-09 VITALS
BODY MASS INDEX: 32.62 KG/M2 | DIASTOLIC BLOOD PRESSURE: 80 MMHG | SYSTOLIC BLOOD PRESSURE: 136 MMHG | HEART RATE: 90 BPM | HEIGHT: 71 IN | TEMPERATURE: 98 F | WEIGHT: 233 LBS | OXYGEN SATURATION: 98 %

## 2022-02-09 DIAGNOSIS — Z00.00 PHYSICAL EXAM: Primary | ICD-10-CM

## 2022-02-09 DIAGNOSIS — Z72.0 TOBACCO ABUSE: ICD-10-CM

## 2022-02-09 DIAGNOSIS — Z00.00 PHYSICAL EXAM: ICD-10-CM

## 2022-02-09 DIAGNOSIS — I10 ESSENTIAL HYPERTENSION: ICD-10-CM

## 2022-02-09 DIAGNOSIS — K57.90 DIVERTICULOSIS: ICD-10-CM

## 2022-02-09 LAB
ALBUMIN SERPL-MCNC: 3.1 G/DL (ref 3.4–5)
ALBUMIN/GLOB SERPL: 0.9 {RATIO} (ref 1–2)
ALP LIVER SERPL-CCNC: 73 U/L
ALT SERPL-CCNC: 26 U/L
ANION GAP SERPL CALC-SCNC: 5 MMOL/L (ref 0–18)
AST SERPL-CCNC: 21 U/L (ref 15–37)
BASOPHILS # BLD AUTO: 0.1 X10(3) UL (ref 0–0.2)
BASOPHILS NFR BLD AUTO: 1.2 %
BILIRUB SERPL-MCNC: 0.3 MG/DL (ref 0.1–2)
BILIRUB UR QL: NEGATIVE
BUN BLD-MCNC: 20 MG/DL (ref 7–18)
BUN/CREAT SERPL: 22.2 (ref 10–20)
CALCIUM BLD-MCNC: 9.5 MG/DL (ref 8.5–10.1)
CHLORIDE SERPL-SCNC: 107 MMOL/L (ref 98–112)
CHOLEST SERPL-MCNC: 260 MG/DL (ref ?–200)
CO2 SERPL-SCNC: 26 MMOL/L (ref 21–32)
COLOR UR: YELLOW
COMPLEXED PSA SERPL-MCNC: 0.79 NG/ML (ref ?–4)
CREAT BLD-MCNC: 0.9 MG/DL
DEPRECATED RDW RBC AUTO: 46.1 FL (ref 35.1–46.3)
EOSINOPHIL # BLD AUTO: 0.33 X10(3) UL (ref 0–0.7)
EOSINOPHIL NFR BLD AUTO: 3.9 %
ERYTHROCYTE [DISTWIDTH] IN BLOOD BY AUTOMATED COUNT: 14.7 % (ref 11–15)
FASTING PATIENT LIPID ANSWER: NO
FASTING STATUS PATIENT QL REPORTED: NO
GLOBULIN PLAS-MCNC: 3.4 G/DL (ref 2.8–4.4)
GLUCOSE BLD-MCNC: 101 MG/DL (ref 70–99)
GLUCOSE UR-MCNC: NEGATIVE MG/DL
HCT VFR BLD AUTO: 55 %
HDLC SERPL-MCNC: 40 MG/DL (ref 40–59)
HGB BLD-MCNC: 18 G/DL
IMM GRANULOCYTES # BLD AUTO: 0.06 X10(3) UL (ref 0–1)
IMM GRANULOCYTES NFR BLD: 0.7 %
KETONES UR-MCNC: NEGATIVE MG/DL
LDLC SERPL CALC-MCNC: 182 MG/DL (ref ?–100)
LEUKOCYTE ESTERASE UR QL STRIP.AUTO: NEGATIVE
LYMPHOCYTES # BLD AUTO: 3.18 X10(3) UL (ref 1–4)
LYMPHOCYTES NFR BLD AUTO: 37.7 %
MCH RBC QN AUTO: 28.3 PG (ref 26–34)
MCHC RBC AUTO-ENTMCNC: 32.7 G/DL (ref 31–37)
MCV RBC AUTO: 86.5 FL
MONOCYTES # BLD AUTO: 0.79 X10(3) UL (ref 0.1–1)
MONOCYTES NFR BLD AUTO: 9.4 %
NEUTROPHILS # BLD AUTO: 3.97 X10 (3) UL (ref 1.5–7.7)
NEUTROPHILS # BLD AUTO: 3.97 X10(3) UL (ref 1.5–7.7)
NEUTROPHILS NFR BLD AUTO: 47.1 %
NITRITE UR QL STRIP.AUTO: NEGATIVE
NONHDLC SERPL-MCNC: 220 MG/DL (ref ?–130)
OSMOLALITY SERPL CALC.SUM OF ELEC: 289 MOSM/KG (ref 275–295)
PH UR: 5 [PH] (ref 5–8)
PLATELET # BLD AUTO: 452 10(3)UL (ref 150–450)
POTASSIUM SERPL-SCNC: 5.1 MMOL/L (ref 3.5–5.1)
PROT SERPL-MCNC: 6.5 G/DL (ref 6.4–8.2)
PROT UR-MCNC: >=500 MG/DL
RBC # BLD AUTO: 6.36 X10(6)UL
SODIUM SERPL-SCNC: 138 MMOL/L (ref 136–145)
SP GR UR STRIP: 1.03 (ref 1–1.03)
TRIGL SERPL-MCNC: 199 MG/DL (ref 30–149)
UROBILINOGEN UR STRIP-ACNC: <2
VLDLC SERPL CALC-MCNC: 41 MG/DL (ref 0–30)
WBC # BLD AUTO: 8.4 X10(3) UL (ref 4–11)

## 2022-02-09 PROCEDURE — 99396 PREV VISIT EST AGE 40-64: CPT | Performed by: INTERNAL MEDICINE

## 2022-02-09 PROCEDURE — 3079F DIAST BP 80-89 MM HG: CPT | Performed by: INTERNAL MEDICINE

## 2022-02-09 PROCEDURE — 3075F SYST BP GE 130 - 139MM HG: CPT | Performed by: INTERNAL MEDICINE

## 2022-02-09 PROCEDURE — 93000 ELECTROCARDIOGRAM COMPLETE: CPT | Performed by: INTERNAL MEDICINE

## 2022-02-09 PROCEDURE — 84443 ASSAY THYROID STIM HORMONE: CPT

## 2022-02-09 PROCEDURE — 36415 COLL VENOUS BLD VENIPUNCTURE: CPT

## 2022-02-09 PROCEDURE — 85025 COMPLETE CBC W/AUTO DIFF WBC: CPT

## 2022-02-09 PROCEDURE — 80061 LIPID PANEL: CPT

## 2022-02-09 PROCEDURE — 80053 COMPREHEN METABOLIC PANEL: CPT

## 2022-02-09 PROCEDURE — 3008F BODY MASS INDEX DOCD: CPT | Performed by: INTERNAL MEDICINE

## 2022-02-09 PROCEDURE — 85060 BLOOD SMEAR INTERPRETATION: CPT

## 2022-02-09 PROCEDURE — 81001 URINALYSIS AUTO W/SCOPE: CPT | Performed by: INTERNAL MEDICINE

## 2022-02-09 RX ORDER — VARENICLINE TARTRATE 0.5 (11)-1
0.5 KIT ORAL 2 TIMES DAILY
Qty: 1 EACH | Refills: 2 | Status: SHIPPED | OUTPATIENT
Start: 2022-02-09 | End: 2022-02-25

## 2022-02-10 ENCOUNTER — TELEPHONE (OUTPATIENT)
Dept: INTERNAL MEDICINE CLINIC | Facility: CLINIC | Age: 62
End: 2022-02-10

## 2022-02-10 RX ORDER — ATORVASTATIN CALCIUM 10 MG/1
10 TABLET, FILM COATED ORAL NIGHTLY
Qty: 90 TABLET | Refills: 3 | Status: SHIPPED | OUTPATIENT
Start: 2022-02-10

## 2022-02-10 NOTE — TELEPHONE ENCOUNTER
I spoke with [facility] lab. Patient will be able to  his collection supplies from a lab location. He will collect every urine for a 24 hour period and drop off the specimen at a lab location. No other instructions were given. I spoke with patient and relayed this to him. He verbalized understanding. He would like to do his  and his drop off to the [facility] lab. To Dr. Hudson Sample just Elliot Zhao. Patient will drop off his specimen on Tuesday.

## 2022-02-10 NOTE — TELEPHONE ENCOUNTER
Patient is calling regarding the Protien, 24 HR Urine order. Patient will pick the kit up tomorrow in the lab. On Monday 1/14/22 at 1:30 pm he will start the test  On Tuesday 1/15/22 at 1:30 pm he will stop, and take it to the lab.     Is this the correct instructions for the test?    Please call patient [phone]

## 2022-02-15 ENCOUNTER — LAB ENCOUNTER (OUTPATIENT)
Dept: LAB | Age: 62
End: 2022-02-15
Attending: INTERNAL MEDICINE
Payer: COMMERCIAL

## 2022-02-15 ENCOUNTER — OFFICE VISIT (OUTPATIENT)
Dept: HEMATOLOGY/ONCOLOGY | Facility: HOSPITAL | Age: 62
End: 2022-02-15
Attending: INTERNAL MEDICINE
Payer: COMMERCIAL

## 2022-02-15 VITALS
WEIGHT: 238 LBS | DIASTOLIC BLOOD PRESSURE: 87 MMHG | HEART RATE: 104 BPM | BODY MASS INDEX: 33.32 KG/M2 | HEIGHT: 71 IN | OXYGEN SATURATION: 97 % | RESPIRATION RATE: 18 BRPM | SYSTOLIC BLOOD PRESSURE: 136 MMHG | TEMPERATURE: 99 F

## 2022-02-15 DIAGNOSIS — R91.1 PULMONARY NODULE: Primary | ICD-10-CM

## 2022-02-15 DIAGNOSIS — D75.1 ERYTHROCYTOSIS: ICD-10-CM

## 2022-02-15 DIAGNOSIS — R82.90 ABNORMAL URINALYSIS: ICD-10-CM

## 2022-02-15 LAB
M PROTEIN 24H UR ELPH-MRATE: ABNORMAL MG/24 HR (ref ?–149.1)
SPECIMEN VOL UR: 3500 ML

## 2022-02-15 PROCEDURE — 99244 OFF/OP CNSLTJ NEW/EST MOD 40: CPT | Performed by: INTERNAL MEDICINE

## 2022-02-15 PROCEDURE — 84156 ASSAY OF PROTEIN URINE: CPT

## 2022-02-15 NOTE — TELEPHONE ENCOUNTER
Patient calling very concerned about Protein 24 Hour Urine results. Viewed results on [facility], patient states \"numbers are off the chart. \"   Leaving tomorrow for [employer]. Would like call back today.     Best call back number [phone]

## 2022-02-16 ENCOUNTER — TELEPHONE (OUTPATIENT)
Dept: INTERNAL MEDICINE CLINIC | Facility: CLINIC | Age: 62
End: 2022-02-16

## 2022-02-16 NOTE — TELEPHONE ENCOUNTER
Pt asked that Dr Markel Portillo please call him  He spoke with Dr Be Randle and she has scheduled pt for an appt on Friday, 2/18/22  Pt said she feels that surgery with Dr Geri Shine for next Wednesday is necessary and would like to be sure it is not cancelled as she feels that what is going on with colon is affecting the kidney   Does patient need to call Dr Geri Shine office or will Dr Markel Portillo?   Please call pt to discuss further  Tasked to nursing

## 2022-02-16 NOTE — CONSULTS
[facility]    PATIENT'S NAME: Catherine TYSON   CONSULTING PHYSICIAN: Jill Bojorquez. Zaira Zelaya MD   PATIENT ACCOUNT #: [de-identified] LOCATION: [address] [place] [address] RECORD #: [id number] YOB: 1960   CONSULTATION DATE: 02/15/2022       [facility] NEW PATIENT CONSULT    REQUESTING PHYSICIAN:  Anum Martinez MD.    REASON FOR CONSULTATION:  Erythrocytosis. HISTORY OF PRESENT ILLNESS:  Patient is a very pleasant 29-year-old male, current smoker, being evaluated by Hematology for erythrocytosis. He also has a history of a right middle lobe pulmonary nodule. The patient had a CBC through his primary care physician 02/09/2022 where his hemoglobin was found to be 18.0, his platelet count was 284,736 and white blood cell count 8000. He had a hemoglobin of 17.4 at [facility] January 2021. Further labs in our system, including July 2021 show hemoglobin of 16.7, platelets 196,187, white blood cell count 10,000. With regard to his pulmonary nodule, he had a CT of the chest 08/24/2021 that showed a 0.9 cm right middle lobe nodule. He underwent a PET/CT 09/30/2021 and the nodule did not have any hypermetabolic activity. He has a surgery upcoming for diverticulitis. He states he is otherwise feeling reasonably well overall. He denies any fevers or chills. He does state he works out regularly and notes he was fasting at time of his most recent blood work. He denies any focal neurological deficits. He denies any history of stroke or venous thromboembolic disease. He is otherwise without complaints. PAST MEDICAL HISTORY:  Hypertension, diverticulitis, hyperlipidemia, smoking, hemorrhoids. MEDICATIONS:  Amlodipine, atorvastatin, lisinopril, metoclopramide, metronidazole, Chantix. ALLERGIES:  Sulfa, aspirin, hydrochlorothiazide. SOCIAL HISTORY:  Admits to smoking, trying to quit. Denies any significant alcohol use. Denies illicit drug use.     FAMILY MEDICAL HISTORY:  No history of any hematologic disorders in the family, specifically mother and father. REVIEW OF SYSTEMS:  All other systems reviewed and negative x12. PHYSICAL EXAMINATION:    GENERAL:  No acute distress. Alert and oriented. VITAL SIGNS:  ECOG performance status is 0. Weight is 107.9 kg. Blood pressure is 136/87, pulse 90, respiratory rate 16, pulse ox 97% on room air. HEENT:  Moist mucous membranes. Oropharynx clear. NECK:  Supple. LUNGS:  Symmetric expansion, nonlabored breathing. HEART:  Good distal pulses. ABDOMEN:  Soft, nontender, nondistended. No masses. EXTREMITIES:  No edema. SKIN:  No visible or palpable lesions. LYMPHATICS:  No visible adenopathy. NEUROLOGIC:  Moving all extremities. Cranial nerves intact. PSYCHIATRIC:  Appropriate mood, appropriate affect. MUSCULOSKELETAL:  No deformity    LABORATORY DATA:  As per HPI. IMAGING:  As per HPI. ASSESSMENT AND PLAN:  The patient is a very pleasant 64-year-old male, current smoker, being evaluated by Hematology for erythrocytosis. We reviewed the patient's lab work over the last several years. His most recent hemoglobin has increased. We suspect this is either secondary erythrocytosis or secondary to volume depletion and hemoconcentration at the time of his most recent lab draw. He has had thrombocytosis at times also, however. We will repeat his lab work in several weeks and if he continues to have erythrocytosis, we will check an erythropoietin level to help differentiate the primary versus secondary process. From our standpoint, it is okay to proceed with his upcoming colon surgery. We did review his findings of a pulmonary nodule seen on imaging in August 2021; this was not hypermetabolic. However, given the patient's smoking history, we would advise a followup CT and we set this up for return to clinic in the spring.     Thank you very much for the consultation request and for allowing us to participate in the care of this delightful patient. Dictated By Cherilyn Ny Storm Blizzard, MD  d: 02/15/2022 19:03:56  t: 02/16/2022 01:49:15  [facility] [id number]  [facility]/    cc: MD Christina Kyle.  Edil Meadows MD

## 2022-02-18 ENCOUNTER — LAB ENCOUNTER (OUTPATIENT)
Dept: LAB | Age: 62
End: 2022-02-18
Attending: INTERNAL MEDICINE
Payer: COMMERCIAL

## 2022-02-18 DIAGNOSIS — R91.1 PULMONARY NODULE: ICD-10-CM

## 2022-02-18 DIAGNOSIS — N04.9 NEPHROTIC SYNDROME: ICD-10-CM

## 2022-02-18 DIAGNOSIS — D75.1 ERYTHROCYTOSIS: ICD-10-CM

## 2022-02-18 LAB
BASOPHILS # BLD AUTO: 0.08 X10(3) UL (ref 0–0.2)
BASOPHILS NFR BLD AUTO: 0.8 %
C3 SERPL-MCNC: 134 MG/DL (ref 90–180)
C4 SERPL-MCNC: 25.4 MG/DL (ref 10–40)
DEPRECATED RDW RBC AUTO: 46.2 FL (ref 35.1–46.3)
EOSINOPHIL # BLD AUTO: 0.16 X10(3) UL (ref 0–0.7)
EOSINOPHIL NFR BLD AUTO: 1.5 %
ERYTHROCYTE [DISTWIDTH] IN BLOOD BY AUTOMATED COUNT: 14.5 % (ref 11–15)
HCT VFR BLD AUTO: 53.1 %
HGB BLD-MCNC: 17.6 G/DL
IMM GRANULOCYTES # BLD AUTO: 0.04 X10(3) UL (ref 0–1)
IMM GRANULOCYTES NFR BLD: 0.4 %
LYMPHOCYTES # BLD AUTO: 2.5 X10(3) UL (ref 1–4)
LYMPHOCYTES NFR BLD AUTO: 23.6 %
MCH RBC QN AUTO: 28.9 PG (ref 26–34)
MCHC RBC AUTO-ENTMCNC: 33.1 G/DL (ref 31–37)
MCV RBC AUTO: 87 FL
MONOCYTES # BLD AUTO: 0.83 X10(3) UL (ref 0.1–1)
MONOCYTES NFR BLD AUTO: 7.8 %
NEUTROPHILS # BLD AUTO: 7 X10 (3) UL (ref 1.5–7.7)
NEUTROPHILS # BLD AUTO: 7 X10(3) UL (ref 1.5–7.7)
NEUTROPHILS NFR BLD AUTO: 65.9 %
PLATELET # BLD AUTO: 464 10(3)UL (ref 150–450)
RBC # BLD AUTO: 6.1 X10(6)UL
WBC # BLD AUTO: 10.6 X10(3) UL (ref 4–11)

## 2022-02-18 PROCEDURE — 85025 COMPLETE CBC W/AUTO DIFF WBC: CPT

## 2022-02-18 PROCEDURE — 86334 IMMUNOFIX E-PHORESIS SERUM: CPT

## 2022-02-18 PROCEDURE — 86160 COMPLEMENT ANTIGEN: CPT

## 2022-02-18 PROCEDURE — 86036 ANCA SCREEN EACH ANTIBODY: CPT

## 2022-02-18 PROCEDURE — 86255 FLUORESCENT ANTIBODY SCREEN: CPT

## 2022-02-18 PROCEDURE — 36415 COLL VENOUS BLD VENIPUNCTURE: CPT

## 2022-02-18 PROCEDURE — 86225 DNA ANTIBODY NATIVE: CPT

## 2022-02-18 PROCEDURE — 83516 IMMUNOASSAY NONANTIBODY: CPT

## 2022-02-18 PROCEDURE — 86038 ANTINUCLEAR ANTIBODIES: CPT

## 2022-02-21 ENCOUNTER — LAB ENCOUNTER (OUTPATIENT)
Dept: LAB | Facility: HOSPITAL | Age: 62
End: 2022-02-21
Attending: SURGERY
Payer: COMMERCIAL

## 2022-02-21 DIAGNOSIS — Z01.818 PREOP TESTING: ICD-10-CM

## 2022-02-21 LAB
ANTIBODY SCREEN: NEGATIVE
NUCLEAR IGG TITR SER IF: NEGATIVE {TITER}
RH BLOOD TYPE: POSITIVE
RH BLOOD TYPE: POSITIVE
SARS-COV-2 RNA RESP QL NAA+PROBE: NOT DETECTED

## 2022-02-21 PROCEDURE — 86900 BLOOD TYPING SEROLOGIC ABO: CPT

## 2022-02-21 PROCEDURE — 86901 BLOOD TYPING SEROLOGIC RH(D): CPT

## 2022-02-21 PROCEDURE — 86850 RBC ANTIBODY SCREEN: CPT

## 2022-02-21 PROCEDURE — 36415 COLL VENOUS BLD VENIPUNCTURE: CPT

## 2022-02-22 ENCOUNTER — LAB REQUISITION (OUTPATIENT)
Dept: SURGERY | Age: 62
End: 2022-02-22
Payer: COMMERCIAL

## 2022-02-22 ENCOUNTER — TELEPHONE (OUTPATIENT)
Dept: INTERNAL MEDICINE CLINIC | Facility: CLINIC | Age: 62
End: 2022-02-22

## 2022-02-22 ENCOUNTER — SURGERY CENTER DOCUMENTATION (OUTPATIENT)
Dept: SURGERY | Age: 62
End: 2022-02-22

## 2022-02-22 LAB
DSDNA AB TITR SER: <10 {TITER}
MYELOPEROX ANTIBODIES, IGG: 1 AU/ML
SERINE PROTEASE 3, IGG: 0 AU/ML

## 2022-02-22 PROCEDURE — 88305 TISSUE EXAM BY PATHOLOGIST: CPT | Performed by: SURGERY

## 2022-02-22 NOTE — PROCEDURES
[facility] SURGICAL CENTER OPERATIVE REPORT:     PATIENT NAME: Hiro Fuchs  : 1960   MRN: [id number]    DATE OF OPERATION:   22    PREOPERATIVE DIAGNOSIS: Recurrent diverticulitis, preop exam     POSTOPERATIVE DIAGNOSIS: Pancolonic diverticulosis and Polyp(s) of sigmoid colon at 24 cm, mass of sigmoid at 28 cm     PROCEDURE PERFORMED: Flexible sigmoidoscopy to 60 cm, snare polypectomy sigmoid polyp at 24 cm and biopsy of sigmoid mass at 28 cm, ink tatooing     SURGEON:  Mirna Becerril MD    ANESTHESIA: none    Bowel prep was excellent  Aronchick bowel prep scale:  5 Inadequate (repeat preparation needed)  4 Poor (semisolid stool could not be suctioned and <90% of mucosa seen)  3 Fair (semisolid stool could not be suctioned, but >90% of mucosa seen)  2 Good (clear liquid covering up to 25% of mucosa, but >90% of mucosa seen)  1 Excellent (>95% of mucosa seen)    ESTIMATED BLOOD LOSS:   1 ml    The patient understood the indications, details and potential risks and complications including bleeding, infection, perforation, need for repeat colonoscopy or surgery, etc. The patient consented to the procedure. The patient was brought to the endoscopy suite and placed in left lateral position. The colonoscope was advanced up to the descending colon with moderate difficulty. Findings include:  1. Diverticulosis of sigmoid and descending colon  2. Mass of sigmoid involving approximately 1/3 to 1/2 circumference - cold forceps biopsy. Spot ink tattooing done just distal to tumor. 3. Large 1 cm sigmoid polyp at 24 cm - hot snare polypectomy  4.  Normal rectum    Recommendations: proceed with surgery    Repeat colonoscopy: 6 months after surgery pending pathology report         Mirna Becerril MD  2022

## 2022-02-22 NOTE — TELEPHONE ENCOUNTER
Spoke with Amarilys Singh in [address] [facility],[address]. She states she was able to see Dr. Oneil Leggett note that patient is cleared to proceed with surgery. She does not need anything further at this time--she states she was just returning the call.

## 2022-02-22 NOTE — TELEPHONE ENCOUNTER
Yes, patient does not need kidney biopsy at this time. He is medically cleared to proceed with surgery.

## 2022-02-22 NOTE — PAT NURSING NOTE
NAYELI Moeller at Dr [facility] at [phone] re: Nephrology clearance. ( Pls see Dr Tatiana Concepcion notes under 24 hr Urine collection results on 2/15/2022). LM to Jazmyne to f/u and fax clearance to PAT.

## 2022-02-22 NOTE — TELEPHONE ENCOUNTER
Dov Yee / Preadmission Testing [facility] is calling patient is scheduled for surgery on 2/23/22. Calling to clarify information before surgery. Notes from 2/15 state there is no need for a biopsy, is this accurate? Dr Yong Prado said she will not do clearance and Dr Jose David Lewis is aware.     Please call Dov Yee [phone]

## 2022-02-23 ENCOUNTER — HOSPITAL ENCOUNTER (INPATIENT)
Facility: HOSPITAL | Age: 62
LOS: 2 days | Discharge: HOME | DRG: 330 | End: 2022-02-25
Attending: SURGERY | Admitting: SURGERY
Payer: COMMERCIAL

## 2022-02-23 ENCOUNTER — ANESTHESIA EVENT (OUTPATIENT)
Dept: SURGERY | Facility: HOSPITAL | Age: 62
DRG: 330 | End: 2022-02-23
Payer: COMMERCIAL

## 2022-02-23 ENCOUNTER — ANESTHESIA (OUTPATIENT)
Dept: SURGERY | Facility: HOSPITAL | Age: 62
DRG: 330 | End: 2022-02-23
Payer: COMMERCIAL

## 2022-02-23 DIAGNOSIS — Z01.818 PREOP TESTING: Primary | ICD-10-CM

## 2022-02-23 DIAGNOSIS — N04.9 NEPHROTIC SYNDROME: ICD-10-CM

## 2022-02-23 DIAGNOSIS — K57.32 DIVERTICULITIS OF COLON: ICD-10-CM

## 2022-02-23 PROBLEM — Z87.19 HISTORY OF DIVERTICULITIS: Status: ACTIVE | Noted: 2022-02-23

## 2022-02-23 LAB
GLUCOSE BLDC GLUCOMTR-MCNC: 105 MG/DL (ref 70–99)
GLUCOSE BLDC GLUCOMTR-MCNC: 199 MG/DL (ref 70–99)

## 2022-02-23 PROCEDURE — 3E0T3BZ INTRODUCTION OF ANESTHETIC AGENT INTO PERIPHERAL NERVES AND PLEXI, PERCUTANEOUS APPROACH: ICD-10-PCS | Performed by: SURGERY

## 2022-02-23 PROCEDURE — 0DTJ4ZZ RESECTION OF APPENDIX, PERCUTANEOUS ENDOSCOPIC APPROACH: ICD-10-PCS | Performed by: SURGERY

## 2022-02-23 PROCEDURE — 0DTG4ZZ RESECTION OF LEFT LARGE INTESTINE, PERCUTANEOUS ENDOSCOPIC APPROACH: ICD-10-PCS | Performed by: SURGERY

## 2022-02-23 PROCEDURE — 0DSL4ZZ REPOSITION TRANSVERSE COLON, PERCUTANEOUS ENDOSCOPIC APPROACH: ICD-10-PCS | Performed by: SURGERY

## 2022-02-23 RX ORDER — METRONIDAZOLE 500 MG/100ML
500 INJECTION, SOLUTION INTRAVENOUS ONCE
Status: COMPLETED | OUTPATIENT
Start: 2022-02-23 | End: 2022-02-23

## 2022-02-23 RX ORDER — ATORVASTATIN CALCIUM 10 MG/1
10 TABLET, FILM COATED ORAL NIGHTLY
Status: DISCONTINUED | OUTPATIENT
Start: 2022-02-23 | End: 2022-02-25

## 2022-02-23 RX ORDER — SODIUM CHLORIDE 9 MG/ML
INJECTION, SOLUTION INTRAVENOUS CONTINUOUS PRN
Status: DISCONTINUED | OUTPATIENT
Start: 2022-02-23 | End: 2022-02-23 | Stop reason: SURG

## 2022-02-23 RX ORDER — ACETAMINOPHEN 500 MG
1000 TABLET ORAL EVERY 8 HOURS SCHEDULED
Status: DISCONTINUED | OUTPATIENT
Start: 2022-02-23 | End: 2022-02-25

## 2022-02-23 RX ORDER — MORPHINE SULFATE 10 MG/ML
6 INJECTION, SOLUTION INTRAMUSCULAR; INTRAVENOUS EVERY 10 MIN PRN
Status: DISCONTINUED | OUTPATIENT
Start: 2022-02-23 | End: 2022-02-23 | Stop reason: HOSPADM

## 2022-02-23 RX ORDER — FAMOTIDINE 10 MG/ML
20 INJECTION, SOLUTION INTRAVENOUS 2 TIMES DAILY
Status: DISCONTINUED | OUTPATIENT
Start: 2022-02-23 | End: 2022-02-24

## 2022-02-23 RX ORDER — SODIUM CHLORIDE, SODIUM LACTATE, POTASSIUM CHLORIDE, CALCIUM CHLORIDE 600; 310; 30; 20 MG/100ML; MG/100ML; MG/100ML; MG/100ML
INJECTION, SOLUTION INTRAVENOUS CONTINUOUS
Status: DISCONTINUED | OUTPATIENT
Start: 2022-02-23 | End: 2022-02-23

## 2022-02-23 RX ORDER — TRAMADOL HYDROCHLORIDE 50 MG/1
50 TABLET ORAL EVERY 6 HOURS PRN
Status: DISCONTINUED | OUTPATIENT
Start: 2022-02-23 | End: 2022-02-25

## 2022-02-23 RX ORDER — ONDANSETRON 2 MG/ML
4 INJECTION INTRAMUSCULAR; INTRAVENOUS ONCE AS NEEDED
Status: DISCONTINUED | OUTPATIENT
Start: 2022-02-23 | End: 2022-02-23 | Stop reason: HOSPADM

## 2022-02-23 RX ORDER — AMLODIPINE BESYLATE 10 MG/1
10 TABLET ORAL NIGHTLY
Status: DISCONTINUED | OUTPATIENT
Start: 2022-02-23 | End: 2022-02-25

## 2022-02-23 RX ORDER — CEFAZOLIN SODIUM/WATER 2 G/20 ML
2 SYRINGE (ML) INTRAVENOUS EVERY 8 HOURS
Status: COMPLETED | OUTPATIENT
Start: 2022-02-23 | End: 2022-02-24

## 2022-02-23 RX ORDER — LIDOCAINE HYDROCHLORIDE 10 MG/ML
INJECTION, SOLUTION EPIDURAL; INFILTRATION; INTRACAUDAL; PERINEURAL AS NEEDED
Status: DISCONTINUED | OUTPATIENT
Start: 2022-02-23 | End: 2022-02-23 | Stop reason: SURG

## 2022-02-23 RX ORDER — HYDROMORPHONE HYDROCHLORIDE 1 MG/ML
0.4 INJECTION, SOLUTION INTRAMUSCULAR; INTRAVENOUS; SUBCUTANEOUS
Status: DISCONTINUED | OUTPATIENT
Start: 2022-02-23 | End: 2022-02-25

## 2022-02-23 RX ORDER — METOCLOPRAMIDE 10 MG/1
10 TABLET ORAL ONCE
Status: COMPLETED | OUTPATIENT
Start: 2022-02-23 | End: 2022-02-23

## 2022-02-23 RX ORDER — HYDROMORPHONE HYDROCHLORIDE 1 MG/ML
0.2 INJECTION, SOLUTION INTRAMUSCULAR; INTRAVENOUS; SUBCUTANEOUS EVERY 5 MIN PRN
Status: DISCONTINUED | OUTPATIENT
Start: 2022-02-23 | End: 2022-02-23 | Stop reason: HOSPADM

## 2022-02-23 RX ORDER — SODIUM CHLORIDE, SODIUM LACTATE, POTASSIUM CHLORIDE, CALCIUM CHLORIDE 600; 310; 30; 20 MG/100ML; MG/100ML; MG/100ML; MG/100ML
INJECTION, SOLUTION INTRAVENOUS CONTINUOUS
Status: DISCONTINUED | OUTPATIENT
Start: 2022-02-23 | End: 2022-02-23 | Stop reason: HOSPADM

## 2022-02-23 RX ORDER — ALVIMOPAN 12 MG/1
12 CAPSULE ORAL 2 TIMES DAILY
Status: DISCONTINUED | OUTPATIENT
Start: 2022-02-24 | End: 2022-02-24

## 2022-02-23 RX ORDER — HYDROMORPHONE HYDROCHLORIDE 1 MG/ML
0.4 INJECTION, SOLUTION INTRAMUSCULAR; INTRAVENOUS; SUBCUTANEOUS EVERY 5 MIN PRN
Status: DISCONTINUED | OUTPATIENT
Start: 2022-02-23 | End: 2022-02-23 | Stop reason: HOSPADM

## 2022-02-23 RX ORDER — LIDOCAINE HYDROCHLORIDE ANHYDROUS AND DEXTROSE MONOHYDRATE .8; 5 G/100ML; G/100ML
INJECTION, SOLUTION INTRAVENOUS CONTINUOUS PRN
Status: DISCONTINUED | OUTPATIENT
Start: 2022-02-23 | End: 2022-02-23 | Stop reason: SURG

## 2022-02-23 RX ORDER — HEPARIN SODIUM 5000 [USP'U]/ML
5000 INJECTION, SOLUTION INTRAVENOUS; SUBCUTANEOUS EVERY 8 HOURS
Status: DISCONTINUED | OUTPATIENT
Start: 2022-02-24 | End: 2022-02-25

## 2022-02-23 RX ORDER — ONDANSETRON 2 MG/ML
4 INJECTION INTRAMUSCULAR; INTRAVENOUS EVERY 6 HOURS PRN
Status: DISCONTINUED | OUTPATIENT
Start: 2022-02-23 | End: 2022-02-25

## 2022-02-23 RX ORDER — MAGNESIUM OXIDE 400 MG (241.3 MG MAGNESIUM) TABLET
400 TABLET DAILY
Status: DISCONTINUED | OUTPATIENT
Start: 2022-02-23 | End: 2022-02-25

## 2022-02-23 RX ORDER — HALOPERIDOL 5 MG/ML
0.25 INJECTION INTRAMUSCULAR ONCE AS NEEDED
Status: DISCONTINUED | OUTPATIENT
Start: 2022-02-23 | End: 2022-02-23 | Stop reason: HOSPADM

## 2022-02-23 RX ORDER — BUPIVACAINE HYDROCHLORIDE 5 MG/ML
INJECTION, SOLUTION EPIDURAL; INTRACAUDAL AS NEEDED
Status: DISCONTINUED | OUTPATIENT
Start: 2022-02-23 | End: 2022-02-23 | Stop reason: HOSPADM

## 2022-02-23 RX ORDER — HYDROCODONE BITARTRATE AND ACETAMINOPHEN 5; 325 MG/1; MG/1
1 TABLET ORAL AS NEEDED
Status: DISCONTINUED | OUTPATIENT
Start: 2022-02-23 | End: 2022-02-23 | Stop reason: HOSPADM

## 2022-02-23 RX ORDER — KETAMINE HYDROCHLORIDE 50 MG/ML
INJECTION, SOLUTION, CONCENTRATE INTRAMUSCULAR; INTRAVENOUS AS NEEDED
Status: DISCONTINUED | OUTPATIENT
Start: 2022-02-23 | End: 2022-02-23 | Stop reason: SURG

## 2022-02-23 RX ORDER — MORPHINE SULFATE 4 MG/ML
4 INJECTION, SOLUTION INTRAMUSCULAR; INTRAVENOUS EVERY 10 MIN PRN
Status: DISCONTINUED | OUTPATIENT
Start: 2022-02-23 | End: 2022-02-23 | Stop reason: HOSPADM

## 2022-02-23 RX ORDER — POLYETHYLENE GLYCOL 3350 17 G/17G
17 POWDER, FOR SOLUTION ORAL DAILY PRN
Status: DISCONTINUED | OUTPATIENT
Start: 2022-02-23 | End: 2022-02-25

## 2022-02-23 RX ORDER — ROCURONIUM BROMIDE 10 MG/ML
INJECTION, SOLUTION INTRAVENOUS AS NEEDED
Status: DISCONTINUED | OUTPATIENT
Start: 2022-02-23 | End: 2022-02-23 | Stop reason: SURG

## 2022-02-23 RX ORDER — CEFAZOLIN SODIUM/WATER 2 G/20 ML
2 SYRINGE (ML) INTRAVENOUS ONCE
Status: COMPLETED | OUTPATIENT
Start: 2022-02-23 | End: 2022-02-23

## 2022-02-23 RX ORDER — HYDROMORPHONE HYDROCHLORIDE 1 MG/ML
0.6 INJECTION, SOLUTION INTRAMUSCULAR; INTRAVENOUS; SUBCUTANEOUS EVERY 5 MIN PRN
Status: DISCONTINUED | OUTPATIENT
Start: 2022-02-23 | End: 2022-02-23 | Stop reason: HOSPADM

## 2022-02-23 RX ORDER — FAMOTIDINE 20 MG/1
20 TABLET, FILM COATED ORAL 2 TIMES DAILY
Status: DISCONTINUED | OUTPATIENT
Start: 2022-02-23 | End: 2022-02-25

## 2022-02-23 RX ORDER — ONDANSETRON 2 MG/ML
INJECTION INTRAMUSCULAR; INTRAVENOUS AS NEEDED
Status: DISCONTINUED | OUTPATIENT
Start: 2022-02-23 | End: 2022-02-23 | Stop reason: SURG

## 2022-02-23 RX ORDER — NALOXONE HYDROCHLORIDE 0.4 MG/ML
80 INJECTION, SOLUTION INTRAMUSCULAR; INTRAVENOUS; SUBCUTANEOUS AS NEEDED
Status: DISCONTINUED | OUTPATIENT
Start: 2022-02-23 | End: 2022-02-23 | Stop reason: HOSPADM

## 2022-02-23 RX ORDER — SODIUM CHLORIDE, SODIUM LACTATE, POTASSIUM CHLORIDE, CALCIUM CHLORIDE 600; 310; 30; 20 MG/100ML; MG/100ML; MG/100ML; MG/100ML
INJECTION, SOLUTION INTRAVENOUS CONTINUOUS PRN
Status: DISCONTINUED | OUTPATIENT
Start: 2022-02-23 | End: 2022-02-23 | Stop reason: SURG

## 2022-02-23 RX ORDER — MORPHINE SULFATE 4 MG/ML
2 INJECTION, SOLUTION INTRAMUSCULAR; INTRAVENOUS EVERY 10 MIN PRN
Status: DISCONTINUED | OUTPATIENT
Start: 2022-02-23 | End: 2022-02-23 | Stop reason: HOSPADM

## 2022-02-23 RX ORDER — PROCHLORPERAZINE EDISYLATE 5 MG/ML
5 INJECTION INTRAMUSCULAR; INTRAVENOUS ONCE AS NEEDED
Status: DISCONTINUED | OUTPATIENT
Start: 2022-02-23 | End: 2022-02-23 | Stop reason: HOSPADM

## 2022-02-23 RX ORDER — HYDROMORPHONE HYDROCHLORIDE 1 MG/ML
0.2 INJECTION, SOLUTION INTRAMUSCULAR; INTRAVENOUS; SUBCUTANEOUS
Status: DISCONTINUED | OUTPATIENT
Start: 2022-02-23 | End: 2022-02-25

## 2022-02-23 RX ORDER — FAMOTIDINE 20 MG/1
20 TABLET, FILM COATED ORAL ONCE
Status: COMPLETED | OUTPATIENT
Start: 2022-02-23 | End: 2022-02-23

## 2022-02-23 RX ORDER — MIDAZOLAM HYDROCHLORIDE 1 MG/ML
INJECTION INTRAMUSCULAR; INTRAVENOUS AS NEEDED
Status: DISCONTINUED | OUTPATIENT
Start: 2022-02-23 | End: 2022-02-23 | Stop reason: SURG

## 2022-02-23 RX ORDER — HEPARIN SODIUM 5000 [USP'U]/ML
5000 INJECTION, SOLUTION INTRAVENOUS; SUBCUTANEOUS ONCE
Status: COMPLETED | OUTPATIENT
Start: 2022-02-23 | End: 2022-02-23

## 2022-02-23 RX ORDER — HYDROCODONE BITARTRATE AND ACETAMINOPHEN 5; 325 MG/1; MG/1
1 TABLET ORAL EVERY 6 HOURS PRN
Qty: 10 TABLET | Refills: 0 | Status: SHIPPED | OUTPATIENT
Start: 2022-02-23

## 2022-02-23 RX ORDER — METRONIDAZOLE 500 MG/100ML
500 INJECTION, SOLUTION INTRAVENOUS EVERY 8 HOURS
Status: COMPLETED | OUTPATIENT
Start: 2022-02-23 | End: 2022-02-24

## 2022-02-23 RX ORDER — ACETAMINOPHEN 500 MG
1000 TABLET ORAL ONCE
Status: COMPLETED | OUTPATIENT
Start: 2022-02-23 | End: 2022-02-23

## 2022-02-23 RX ORDER — DEXAMETHASONE SODIUM PHOSPHATE 4 MG/ML
VIAL (ML) INJECTION AS NEEDED
Status: DISCONTINUED | OUTPATIENT
Start: 2022-02-23 | End: 2022-02-23 | Stop reason: SURG

## 2022-02-23 RX ORDER — DEXTROSE, SODIUM CHLORIDE, AND POTASSIUM CHLORIDE 5; .45; .15 G/100ML; G/100ML; G/100ML
INJECTION INTRAVENOUS CONTINUOUS
Status: DISCONTINUED | OUTPATIENT
Start: 2022-02-23 | End: 2022-02-24

## 2022-02-23 RX ORDER — DOCUSATE SODIUM 100 MG/1
100 CAPSULE, LIQUID FILLED ORAL 2 TIMES DAILY
Status: DISCONTINUED | OUTPATIENT
Start: 2022-02-23 | End: 2022-02-25

## 2022-02-23 RX ORDER — LISINOPRIL 20 MG/1
20 TABLET ORAL NIGHTLY
Status: DISCONTINUED | OUTPATIENT
Start: 2022-02-23 | End: 2022-02-25

## 2022-02-23 RX ORDER — LABETALOL HYDROCHLORIDE 5 MG/ML
INJECTION, SOLUTION INTRAVENOUS AS NEEDED
Status: DISCONTINUED | OUTPATIENT
Start: 2022-02-23 | End: 2022-02-23 | Stop reason: SURG

## 2022-02-23 RX ORDER — HYDROCODONE BITARTRATE AND ACETAMINOPHEN 5; 325 MG/1; MG/1
2 TABLET ORAL AS NEEDED
Status: DISCONTINUED | OUTPATIENT
Start: 2022-02-23 | End: 2022-02-23 | Stop reason: HOSPADM

## 2022-02-23 RX ADMIN — SODIUM CHLORIDE, SODIUM LACTATE, POTASSIUM CHLORIDE, CALCIUM CHLORIDE: 600; 310; 30; 20 INJECTION, SOLUTION INTRAVENOUS at 13:37:00

## 2022-02-23 RX ADMIN — KETAMINE HYDROCHLORIDE 50 MG: 50 INJECTION, SOLUTION, CONCENTRATE INTRAMUSCULAR; INTRAVENOUS at 13:44:00

## 2022-02-23 RX ADMIN — ROCURONIUM BROMIDE 10 MG: 10 INJECTION, SOLUTION INTRAVENOUS at 15:51:00

## 2022-02-23 RX ADMIN — LIDOCAINE HYDROCHLORIDE 50 MG: 10 INJECTION, SOLUTION EPIDURAL; INFILTRATION; INTRACAUDAL; PERINEURAL at 13:20:00

## 2022-02-23 RX ADMIN — DEXAMETHASONE SODIUM PHOSPHATE 8 MG: 4 MG/ML VIAL (ML) INJECTION at 13:46:00

## 2022-02-23 RX ADMIN — SODIUM CHLORIDE: 9 INJECTION, SOLUTION INTRAVENOUS at 13:50:00

## 2022-02-23 RX ADMIN — ONDANSETRON 4 MG: 2 INJECTION INTRAMUSCULAR; INTRAVENOUS at 16:36:00

## 2022-02-23 RX ADMIN — LIDOCAINE HYDROCHLORIDE ANHYDROUS AND DEXTROSE MONOHYDRATE 1 MG/MIN: .8; 5 INJECTION, SOLUTION INTRAVENOUS at 13:30:00

## 2022-02-23 RX ADMIN — SODIUM CHLORIDE: 9 INJECTION, SOLUTION INTRAVENOUS at 13:23:00

## 2022-02-23 RX ADMIN — ROCURONIUM BROMIDE 10 MG: 10 INJECTION, SOLUTION INTRAVENOUS at 15:14:00

## 2022-02-23 RX ADMIN — ROCURONIUM BROMIDE 10 MG: 10 INJECTION, SOLUTION INTRAVENOUS at 13:20:00

## 2022-02-23 RX ADMIN — ROCURONIUM BROMIDE 10 MG: 10 INJECTION, SOLUTION INTRAVENOUS at 14:18:00

## 2022-02-23 RX ADMIN — MIDAZOLAM HYDROCHLORIDE 2 MG: 1 INJECTION INTRAMUSCULAR; INTRAVENOUS at 13:14:00

## 2022-02-23 RX ADMIN — METRONIDAZOLE 500 MG: 500 INJECTION, SOLUTION INTRAVENOUS at 13:41:00

## 2022-02-23 RX ADMIN — ROCURONIUM BROMIDE 40 MG: 10 INJECTION, SOLUTION INTRAVENOUS at 13:26:00

## 2022-02-23 RX ADMIN — ROCURONIUM BROMIDE 10 MG: 10 INJECTION, SOLUTION INTRAVENOUS at 14:33:00

## 2022-02-23 RX ADMIN — CEFAZOLIN SODIUM/WATER 2 G: 2 G/20 ML SYRINGE (ML) INTRAVENOUS at 13:42:00

## 2022-02-23 RX ADMIN — LABETALOL HYDROCHLORIDE 5 MG: 5 INJECTION, SOLUTION INTRAVENOUS at 14:03:00

## 2022-02-23 RX ADMIN — ROCURONIUM BROMIDE 10 MG: 10 INJECTION, SOLUTION INTRAVENOUS at 16:09:00

## 2022-02-23 RX ADMIN — SODIUM CHLORIDE: 9 INJECTION, SOLUTION INTRAVENOUS at 16:09:00

## 2022-02-23 RX ADMIN — SODIUM CHLORIDE, SODIUM LACTATE, POTASSIUM CHLORIDE, CALCIUM CHLORIDE: 600; 310; 30; 20 INJECTION, SOLUTION INTRAVENOUS at 13:14:00

## 2022-02-23 NOTE — ANESTHESIA PROCEDURE NOTES
Airway  Date/Time: 2/23/2022 1:22 PM  Urgency: Elective    Airway not difficult    General Information and Staff    Patient location during procedure: OR  Anesthesiologist: Corby Gore MD  Resident/CRNA: Rena Aguilera CRNA  Performed: anesthesiologist and CRNA     Indications and Patient Condition  Indications for airway management: anesthesia  Sedation level: deep  Preoxygenated: yes  Patient position: sniffing  Mask difficulty assessment: 1 - vent by mask    Final Airway Details  Final airway type: endotracheal airway      Successful airway: ETT  Cuffed: yes   Successful intubation technique: direct laryngoscopy  Facilitating devices/methods: intubating stylet and anterior pressure/BURP  Endotracheal tube insertion site: oral  Blade: Hoang  Blade size: #4  ETT size (mm): 7.5    Cormack-Lehane Classification: grade IIB - view of arytenoids or posterior of glottis only  Placement verified by: chest auscultation and capnometry   Cuff volume (mL): 7  Measured from: teeth  ETT to teeth (cm): 22  Number of attempts at approach: 1    Additional Comments  Atraumatic. 9.0 opa

## 2022-02-23 NOTE — ANESTHESIA PROCEDURE NOTES
Peripheral IV  Date/Time: 2/23/2022 1:23 PM  Inserted by: Gloria Urena, CRNA    Placement  Needle size: 18 G  Laterality: right  Location: hand  Local anesthetic: none  Site prep: alcohol  Technique: anatomical landmarks  Attempts: 1

## 2022-02-23 NOTE — OPERATIVE REPORT
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     POSTOPERATIVE DIAGNOSIS: same     PROCEDURE PERFORMED: Laparoscopic Left Hemicolectomy, Takedown of Splenic Flexure, Appendectomy, omentoplasty of anastomosis and Bilateral TAP blocks     SURGEON:  Kimberly Bernal MD    ASST: Nicolasa Blair PA-C (Assistant helped position patient and helped with positioning, intraoperative retraction, suturing, wound closure, etc)    ANESTHESIA: General anesthesia    ESTIMATED BLOOD LOSS:   50 ml    The patient understood the indications, details, potential risks and complications of surgery including bleeding, infection, leak, sepsis, DVT/PE, MI, damage to surrounding structures, possible need for open procedure, possible need for reoperation and colostomy, etc. The patient consented to the operation. The patient was brought to the operating room and was induced under general anesthesia. The patient was placed in lithotomy position and the abdomen and perineum were prepped and draped in the usual sterile fashion after Arreguin catheter was inserted. The abdomen was insuflated after insertion of GelPort. Ports included 5 mm ports in periumbilical epigastric and RLQ sites and another 5 mm port left side. A small incision was made in the Pfannenstiel position and GelPort was inserted. Bilateral TAP blocks were done with Exparel mixed with 0.5% Marcaine plain, 20 ml on each side. The middle rectum below the inked colon was dissected and skeletonized and then the mesentery of the middle rectum was divided with Ligasure 10 cm distal to the tumor.  The mesentery of the sigmoid was mobilized from the retroperitoneum, dissected up to the takeoff of the inferior mesenteric artery and the inferior mesenteric artery was transected using LigaSure. The left ureter was seen and was preserved. The descending colon was mobilized from its retro-peritoneal attachments and the splenic flexure was mobilized completely into the lesser sac and off the tail of the pancreas. The rectum was divided with stapler and the specimen was extracted from the Pfannenstiel incision and the proximal normal colon was dissected and pursestring instrument was placed and the proximal colon was transected and specimen was sent off the operative field. The proximal colon was sized up to size 33 mm and a 29 mm anvil was placed and the pursestring was tied and a second pursestring was placed. Then, an end to end anastomosis was created using an EEA stapler placed transanally in the rectum. Air leak test was negative for air leak. Suture line was oversewn using pericolonic fat using 2-0 PDS sutures. Laparoscopy was used for irrigation and suction and then all ports were removed. The Pfannenstiel incision was closed using 0 PDS with interrupted #1 Vicryl sutures for the anterior rectus sheath fascia. Skin was irrigated and closed using 4-0 Vicryl subarticular sutures. The wounds were dressed with Dermabond for the laparoscopic port sites and Mastisol, Steri-Strips and Tegaderm dressing for the Pfannenstiel incision. The patient tolerated procedure well went to recovery room in stable condition.     Ritesh Mims MD [address] [place] [address]  02/23/22  12:45 PM

## 2022-02-24 PROBLEM — R91.1 LUNG NODULE: Status: ACTIVE | Noted: 2022-02-24

## 2022-02-24 LAB
ANION GAP SERPL CALC-SCNC: 5 MMOL/L (ref 0–18)
BASOPHILS # BLD AUTO: 0.03 X10(3) UL (ref 0–0.2)
BASOPHILS NFR BLD AUTO: 0.2 %
BUN BLD-MCNC: 9 MG/DL (ref 7–18)
BUN/CREAT SERPL: 8 (ref 10–20)
CALCIUM BLD-MCNC: 8.2 MG/DL (ref 8.5–10.1)
CO2 SERPL-SCNC: 26 MMOL/L (ref 21–32)
CREAT BLD-MCNC: 1.13 MG/DL
DEPRECATED RDW RBC AUTO: 44.3 FL (ref 35.1–46.3)
EOSINOPHIL # BLD AUTO: 0 X10(3) UL (ref 0–0.7)
EOSINOPHIL NFR BLD AUTO: 0 %
ERYTHROCYTE [DISTWIDTH] IN BLOOD BY AUTOMATED COUNT: 14 % (ref 11–15)
GLUCOSE BLD-MCNC: 113 MG/DL (ref 70–99)
GLUCOSE BLDC GLUCOMTR-MCNC: 105 MG/DL (ref 70–99)
HCT VFR BLD AUTO: 47.9 %
HGB BLD-MCNC: 15.9 G/DL
IMM GRANULOCYTES # BLD AUTO: 0.07 X10(3) UL (ref 0–1)
IMM GRANULOCYTES NFR BLD: 0.4 %
LYMPHOCYTES # BLD AUTO: 1.98 X10(3) UL (ref 1–4)
LYMPHOCYTES NFR BLD AUTO: 12.5 %
MAGNESIUM SERPL-MCNC: 2.1 MG/DL (ref 1.6–2.6)
MCH RBC QN AUTO: 28.6 PG (ref 26–34)
MCHC RBC AUTO-ENTMCNC: 33.2 G/DL (ref 31–37)
MCV RBC AUTO: 86.3 FL
MONOCYTES # BLD AUTO: 1.54 X10(3) UL (ref 0.1–1)
MONOCYTES NFR BLD AUTO: 9.7 %
NEUTROPHILS # BLD AUTO: 12.25 X10 (3) UL (ref 1.5–7.7)
NEUTROPHILS # BLD AUTO: 12.25 X10(3) UL (ref 1.5–7.7)
NEUTROPHILS NFR BLD AUTO: 77.2 %
OSMOLALITY SERPL CALC.SUM OF ELEC: 281 MOSM/KG (ref 275–295)
PHOSPHATE SERPL-MCNC: 3.7 MG/DL (ref 2.5–4.9)
PLATELET # BLD AUTO: 400 10(3)UL (ref 150–450)
RBC # BLD AUTO: 5.55 X10(6)UL
SODIUM SERPL-SCNC: 136 MMOL/L (ref 136–145)
WBC # BLD AUTO: 15.9 X10(3) UL (ref 4–11)

## 2022-02-24 PROCEDURE — 99222 1ST HOSP IP/OBS MODERATE 55: CPT | Performed by: INTERNAL MEDICINE

## 2022-02-24 NOTE — PLAN OF CARE
Problem: Patient Centered Care  Goal: Patient preferences are identified and integrated in the patient's plan of care  Description: Interventions:  - What would you like us to know as we care for you?   - Provide timely, complete, and accurate information to patient/family  - Incorporate patient and family knowledge, values, beliefs, and cultural backgrounds into the planning and delivery of care  - Encourage patient/family to participate in care and decision-making at the level they choose  - Honor patient and family perspectives and choices  Outcome: Progressing     Patient received A&ox3, VSS. NAD. Diet tolerated overnight. Arreguin removed per orders. Call light in reach and safety needs met.

## 2022-02-24 NOTE — PLAN OF CARE
Patient alert and oriented x4. Vss-- afebrile. On room air. Encouraged IS. Tolerating LF/ soft diet, no nausea, +gas, +BM. Patient reports BM as being tarry, no fresh blood noted. Voiding freely after catheter removal. Heparin for DVT prophylaxis, eliquis coupon in chart per SW. Sx incisions c/d/I. Pain controlled with scheduled tylenol. Ambulating with steady gait, walking in hallway. Fall precautions in place. Plan for discharge tomorrow.    Problem: Patient Centered Care  Goal: Patient preferences are identified and integrated in the patient's plan of care  Description: Interventions:  - What would you like us to know as we care for you?   - Provide timely, complete, and accurate information to patient/family  - Incorporate patient and family knowledge, values, beliefs, and cultural backgrounds into the planning and delivery of care  - Encourage patient/family to participate in care and decision-making at the level they choose  - Honor patient and family perspectives and choices  Outcome: Progressing     Problem: Diabetes/Glucose Control  Goal: Glucose maintained within prescribed range  Description: INTERVENTIONS:  - Monitor Blood Glucose as ordered  - Assess for signs and symptoms of hyperglycemia and hypoglycemia  - Administer ordered medications to maintain glucose within target range  - Assess barriers to adequate nutritional intake and initiate nutrition consult as needed  - Instruct patient on self management of diabetes  Outcome: Progressing     Problem: Patient/Family Goals  Goal: Patient/Family Long Term Goal  Description: Patient's Long Term Goal:     Interventions:  -   - See additional Care Plan goals for specific interventions  Outcome: Progressing  Goal: Patient/Family Short Term Goal  Description: Patient's Short Term Goal:     Interventions:   -   - See additional Care Plan goals for specific interventions  Outcome: Progressing

## 2022-02-24 NOTE — PROGRESS NOTES
[facility] Pharmacy Note: Route Optimization for Famotidine (PEPCID)    Patient is currently on Famotidine (PEPCID) 20 mg IV every 12 hours. The patient meets the criteria to convert to the oral equivalent as established by the IV to Oral conversion protocol approved by the P&T committee. Medication was changed from IV formulation to Famotidine (PEPCID) 20 mg PO every 12 hours per protocol.       Elaine Mon PharmD  2/24/2022,  11:17 AM

## 2022-02-24 NOTE — CM/SW NOTE
MDO Pt needs coupon for 1 month supply of Eliquis (I sent to pharmacy, needs prior auth but only on for 1 month post op for DVT prophy). Free 30 day Trial Eliquis coupon placed in patient chart. SW to follow case. Please notify as needs arise.        MALGORZATA Rizo, [facility]    [id number]

## 2022-02-25 VITALS
SYSTOLIC BLOOD PRESSURE: 132 MMHG | HEART RATE: 78 BPM | RESPIRATION RATE: 18 BRPM | HEIGHT: 71 IN | OXYGEN SATURATION: 99 % | BODY MASS INDEX: 31.5 KG/M2 | DIASTOLIC BLOOD PRESSURE: 77 MMHG | WEIGHT: 225 LBS | TEMPERATURE: 98 F

## 2022-02-25 LAB
ANION GAP SERPL CALC-SCNC: 4 MMOL/L (ref 0–18)
BASOPHILS # BLD AUTO: 0.09 X10(3) UL (ref 0–0.2)
BASOPHILS NFR BLD AUTO: 0.6 %
BUN BLD-MCNC: 12 MG/DL (ref 7–18)
BUN/CREAT SERPL: 13.3 (ref 10–20)
CALCIUM BLD-MCNC: 9 MG/DL (ref 8.5–10.1)
CHLORIDE SERPL-SCNC: 105 MMOL/L (ref 98–112)
CO2 SERPL-SCNC: 29 MMOL/L (ref 21–32)
CREAT BLD-MCNC: 0.9 MG/DL
DEPRECATED RDW RBC AUTO: 45.7 FL (ref 35.1–46.3)
EOSINOPHIL # BLD AUTO: 0.22 X10(3) UL (ref 0–0.7)
EOSINOPHIL NFR BLD AUTO: 1.6 %
ERYTHROCYTE [DISTWIDTH] IN BLOOD BY AUTOMATED COUNT: 14.3 % (ref 11–15)
GLUCOSE BLD-MCNC: 102 MG/DL (ref 70–99)
HCT VFR BLD AUTO: 49.3 %
HGB BLD-MCNC: 16.1 G/DL
IMM GRANULOCYTES # BLD AUTO: 0.07 X10(3) UL (ref 0–1)
IMM GRANULOCYTES NFR BLD: 0.5 %
LYMPHOCYTES # BLD AUTO: 2.96 X10(3) UL (ref 1–4)
LYMPHOCYTES NFR BLD AUTO: 21.3 %
MAGNESIUM SERPL-MCNC: 2.2 MG/DL (ref 1.6–2.6)
MCH RBC QN AUTO: 28.5 PG (ref 26–34)
MCHC RBC AUTO-ENTMCNC: 32.7 G/DL (ref 31–37)
MCV RBC AUTO: 87.4 FL
MONOCYTES # BLD AUTO: 1.57 X10(3) UL (ref 0.1–1)
MONOCYTES NFR BLD AUTO: 11.3 %
NEUTROPHILS # BLD AUTO: 8.98 X10 (3) UL (ref 1.5–7.7)
NEUTROPHILS # BLD AUTO: 8.98 X10(3) UL (ref 1.5–7.7)
NEUTROPHILS NFR BLD AUTO: 64.7 %
OSMOLALITY SERPL CALC.SUM OF ELEC: 286 MOSM/KG (ref 275–295)
PHOSPHATE SERPL-MCNC: 2.7 MG/DL (ref 2.5–4.9)
PLATELET # BLD AUTO: 397 10(3)UL (ref 150–450)
POTASSIUM SERPL-SCNC: 4.8 MMOL/L (ref 3.5–5.1)
RBC # BLD AUTO: 5.64 X10(6)UL
SODIUM SERPL-SCNC: 138 MMOL/L (ref 136–145)
WBC # BLD AUTO: 13.9 X10(3) UL (ref 4–11)

## 2022-02-25 PROCEDURE — 99239 HOSP IP/OBS DSCHRG MGMT >30: CPT | Performed by: INTERNAL MEDICINE

## 2022-02-25 NOTE — PLAN OF CARE
Patient states readiness for discharge. Ambulating in turner independently, pain controlled, voiding and having BMs. Seen by surgeon and hospitalist. Discharge instructions provided including pain control, narcotic medications, wound care, follow up appointments, and where to  prescription. Provided coupon for eliquis. All questions answered. Removed IV and ID band. Patient declined a wheelchair to take him downstairs.      Problem: Patient Centered Care  Goal: Patient preferences are identified and integrated in the patient's plan of care  Description: Interventions:  - What would you like us to know as we care for you?   - Provide timely, complete, and accurate information to patient/family  - Incorporate patient and family knowledge, values, beliefs, and cultural backgrounds into the planning and delivery of care  - Encourage patient/family to participate in care and decision-making at the level they choose  - Honor patient and family perspectives and choices  Outcome: Adequate for Discharge     Problem: Diabetes/Glucose Control  Goal: Glucose maintained within prescribed range  Description: INTERVENTIONS:  - Monitor Blood Glucose as ordered  - Assess for signs and symptoms of hyperglycemia and hypoglycemia  - Administer ordered medications to maintain glucose within target range  - Assess barriers to adequate nutritional intake and initiate nutrition consult as needed  - Instruct patient on self management of diabetes  Outcome: Adequate for Discharge     Problem: Patient/Family Goals  Goal: Patient/Family Long Term Goal  Description: Patient's Long Term Goal: to go home    Interventions:  - Medications as ordered   - Labs and tests as ordered   - Pain management   - Tolerate diet  - Ambulate   - See additional Care Plan goals for specific interventions  Outcome: Adequate for Discharge  Goal: Patient/Family Short Term Goal  Description: Patient's Short Term Goal: to have no pain    Interventions:   - Pain medications as needed   - Nonpharmacologic interventions   - See additional Care Plan goals for specific interventions  Outcome: Adequate for Discharge     Problem: PAIN - ADULT  Goal: Verbalizes/displays adequate comfort level or patient's stated pain goal  Description: INTERVENTIONS:  - Encourage pt to monitor pain and request assistance  - Assess pain using appropriate pain scale  - Administer analgesics based on type and severity of pain and evaluate response  - Implement non-pharmacological measures as appropriate and evaluate response  - Consider cultural and social influences on pain and pain management  - Manage/alleviate anxiety  - Utilize distraction and/or relaxation techniques  - Monitor for opioid side effects  - Notify MD/LIP if interventions unsuccessful or patient reports new pain  - Anticipate increased pain with activity and pre-medicate as appropriate  Outcome: Adequate for Discharge     Problem: DISCHARGE PLANNING  Goal: Discharge to home or other facility with appropriate resources  Description: INTERVENTIONS:  - Identify barriers to discharge w/pt and caregiver  - Include patient/family/discharge partner in discharge planning  - Arrange for needed discharge resources and transportation as appropriate  - Identify discharge learning needs (meds, wound care, etc)  - Arrange for interpreters to assist at discharge as needed  - Consider post-discharge preferences of patient/family/discharge partner  - Complete POLST form as appropriate  - Assess patient's ability to be responsible for managing their own health  - Refer to Case Management Department for coordinating discharge planning if the patient needs post-hospital services based on physician/LIP order or complex needs related to functional status, cognitive ability or social support system  Outcome: Adequate for Discharge     Problem: SKIN/TISSUE INTEGRITY - ADULT  Goal: Incision(s), wounds(s) or drain site(s) healing without S/S of infection  Description: INTERVENTIONS:  - Assess and document risk factors for pressure ulcer development  - Assess and document skin integrity  - Assess and document dressing/incision, wound bed, drain sites and surrounding tissue  - Implement wound care per orders  - Initiate isolation precautions as appropriate  - Initiate Pressure Ulcer prevention bundle as indicated  Outcome: Adequate for Discharge

## 2022-02-25 NOTE — PLAN OF CARE
No acute changes overnight. Pain managed with scheduled tylenol. Lap sites are C/D/I. Tolerating diet without nausea/vomiting. Voiding freely, passing gas and had a bowel movement yesterday. Up independently. Plan for discharge home today.     Problem: Patient Centered Care  Goal: Patient preferences are identified and integrated in the patient's plan of care  Description: Interventions:  - What would you like us to know as we care for you?   - Provide timely, complete, and accurate information to patient/family  - Incorporate patient and family knowledge, values, beliefs, and cultural backgrounds into the planning and delivery of care  - Encourage patient/family to participate in care and decision-making at the level they choose  - Honor patient and family perspectives and choices  Outcome: Progressing     Problem: Diabetes/Glucose Control  Goal: Glucose maintained within prescribed range  Description: INTERVENTIONS:  - Monitor Blood Glucose as ordered  - Assess for signs and symptoms of hyperglycemia and hypoglycemia  - Administer ordered medications to maintain glucose within target range  - Assess barriers to adequate nutritional intake and initiate nutrition consult as needed  - Instruct patient on self management of diabetes  Outcome: Progressing     Problem: Patient/Family Goals  Goal: Patient/Family Long Term Goal  Description: Patient's Long Term Goal: to go home    Interventions:  - Medications as ordered   - Labs and tests as ordered   - Pain management   - Tolerate diet  - Ambulate   - See additional Care Plan goals for specific interventions  Outcome: Progressing  Goal: Patient/Family Short Term Goal  Description: Patient's Short Term Goal: to have no pain    Interventions:   - Pain medications as needed   - Nonpharmacologic interventions   - See additional Care Plan goals for specific interventions  Outcome: Progressing     Problem: PAIN - ADULT  Goal: Verbalizes/displays adequate comfort level or patient's stated pain goal  Description: INTERVENTIONS:  - Encourage pt to monitor pain and request assistance  - Assess pain using appropriate pain scale  - Administer analgesics based on type and severity of pain and evaluate response  - Implement non-pharmacological measures as appropriate and evaluate response  - Consider cultural and social influences on pain and pain management  - Manage/alleviate anxiety  - Utilize distraction and/or relaxation techniques  - Monitor for opioid side effects  - Notify MD/LIP if interventions unsuccessful or patient reports new pain  - Anticipate increased pain with activity and pre-medicate as appropriate  Outcome: Progressing     Problem: DISCHARGE PLANNING  Goal: Discharge to home or other facility with appropriate resources  Description: INTERVENTIONS:  - Identify barriers to discharge w/pt and caregiver  - Include patient/family/discharge partner in discharge planning  - Arrange for needed discharge resources and transportation as appropriate  - Identify discharge learning needs (meds, wound care, etc)  - Arrange for interpreters to assist at discharge as needed  - Consider post-discharge preferences of patient/family/discharge partner  - Complete POLST form as appropriate  - Assess patient's ability to be responsible for managing their own health  - Refer to Case Management Department for coordinating discharge planning if the patient needs post-hospital services based on physician/LIP order or complex needs related to functional status, cognitive ability or social support system  Outcome: Progressing     Problem: SKIN/TISSUE INTEGRITY - ADULT  Goal: Incision(s), wounds(s) or drain site(s) healing without S/S of infection  Description: INTERVENTIONS:  - Assess and document risk factors for pressure ulcer development  - Assess and document skin integrity  - Assess and document dressing/incision, wound bed, drain sites and surrounding tissue  - Implement wound care per orders  - Initiate isolation precautions as appropriate  - Initiate Pressure Ulcer prevention bundle as indicated  Outcome: Progressing

## 2022-02-28 ENCOUNTER — TELEPHONE (OUTPATIENT)
Dept: INTERNAL MEDICINE CLINIC | Facility: CLINIC | Age: 62
End: 2022-02-28

## 2022-02-28 ENCOUNTER — PATIENT OUTREACH (OUTPATIENT)
Dept: CASE MANAGEMENT | Age: 62
End: 2022-02-28

## 2022-02-28 RX ORDER — ACETAMINOPHEN 500 MG
500 TABLET ORAL EVERY 6 HOURS PRN
COMMUNITY

## 2022-02-28 NOTE — TELEPHONE ENCOUNTER
To Fd----    Please call pt to encourage TCM. Thanks!  If pt declines again, please route FYI to Dr. Karel Browning.

## 2022-02-28 NOTE — TELEPHONE ENCOUNTER
Patient was called per request below. Patient informed  that he spoke with Dr Douglas Quezada on Friday when he was released from the hospital. Dr Douglas Quezada informed him at that time that he did not need to see him because he already has an appointment with Dr Jeremi Armando on Thursday, 3/10/2022. Please disregard. FYI.

## 2022-02-28 NOTE — TELEPHONE ENCOUNTER
Spoke to the pt today for TCM. The patient was recently hospitalized for laparoscopic hemicolectomy and appendectomy. The pt does not have a HFU appt scheduled at this time and declined scheduling when offered by the [facility]. The patient only prefers to FU with Dr. Ministerio Teresa at this time (3/10/2022). A TCM/HFU appt is recommended by 3/11/2022 as the pt is a moderate risk for readmission. Please advise. BOOK BY DATE (last date for TCM): 3/11/2022    TRIAGE:  Please f/u with the pt and try to get him to schedule if Dr. Thaddeus Ospina still recommends a TCM/HFU appt. Thank you!

## 2022-04-22 ENCOUNTER — APPOINTMENT (OUTPATIENT)
Dept: HEMATOLOGY/ONCOLOGY | Facility: HOSPITAL | Age: 62
End: 2022-04-22
Attending: INTERNAL MEDICINE
Payer: COMMERCIAL

## 2022-05-04 ENCOUNTER — HOSPITAL ENCOUNTER (OUTPATIENT)
Dept: CT IMAGING | Facility: HOSPITAL | Age: 62
Discharge: HOME | End: 2022-05-04
Attending: INTERNAL MEDICINE
Payer: COMMERCIAL

## 2022-05-04 DIAGNOSIS — R91.1 PULMONARY NODULE: ICD-10-CM

## 2022-05-04 DIAGNOSIS — D75.1 ERYTHROCYTOSIS: ICD-10-CM

## 2022-05-04 LAB — CREAT BLD-MCNC: 0.9 MG/DL

## 2022-05-04 PROCEDURE — 71260 CT THORAX DX C+: CPT | Performed by: INTERNAL MEDICINE

## 2022-05-04 PROCEDURE — 82565 ASSAY OF CREATININE: CPT

## 2022-05-06 ENCOUNTER — LAB ENCOUNTER (OUTPATIENT)
Dept: LAB | Facility: HOSPITAL | Age: 62
End: 2022-05-06
Attending: INTERNAL MEDICINE
Payer: COMMERCIAL

## 2022-05-06 ENCOUNTER — NURSE ONLY (OUTPATIENT)
Dept: HEMATOLOGY/ONCOLOGY | Facility: HOSPITAL | Age: 62
End: 2022-05-06
Attending: INTERNAL MEDICINE
Payer: COMMERCIAL

## 2022-05-06 VITALS
WEIGHT: 236 LBS | BODY MASS INDEX: 33.04 KG/M2 | RESPIRATION RATE: 18 BRPM | TEMPERATURE: 99 F | HEART RATE: 97 BPM | OXYGEN SATURATION: 99 % | DIASTOLIC BLOOD PRESSURE: 85 MMHG | SYSTOLIC BLOOD PRESSURE: 139 MMHG | HEIGHT: 71 IN

## 2022-05-06 DIAGNOSIS — R91.1 PULMONARY NODULE: Primary | ICD-10-CM

## 2022-05-06 DIAGNOSIS — D75.1 ERYTHROCYTOSIS: ICD-10-CM

## 2022-05-06 DIAGNOSIS — R91.1 PULMONARY NODULE: ICD-10-CM

## 2022-05-06 DIAGNOSIS — N04.9 NEPHROTIC SYNDROME WITH MORPHOLOGIC CHANGE: ICD-10-CM

## 2022-05-06 DIAGNOSIS — I10 HYPERTENSION, ESSENTIAL: ICD-10-CM

## 2022-05-06 DIAGNOSIS — D75.839 THROMBOCYTOSIS: ICD-10-CM

## 2022-05-06 LAB
ALBUMIN SERPL-MCNC: 2.8 G/DL (ref 3.4–5)
ANION GAP SERPL CALC-SCNC: 4 MMOL/L (ref 0–18)
BASOPHILS # BLD AUTO: 0.11 X10(3) UL (ref 0–0.2)
BASOPHILS NFR BLD AUTO: 1.1 %
BILIRUB UR QL: NEGATIVE
BUN BLD-MCNC: 15 MG/DL (ref 7–18)
BUN/CREAT SERPL: 16.5 (ref 10–20)
CALCIUM BLD-MCNC: 8.9 MG/DL (ref 8.5–10.1)
CHLORIDE SERPL-SCNC: 107 MMOL/L (ref 98–112)
CLARITY UR: CLEAR
CO2 SERPL-SCNC: 27 MMOL/L (ref 21–32)
COLOR UR: YELLOW
CREAT BLD-MCNC: 0.91 MG/DL
CREAT UR-SCNC: 144 MG/DL
DEPRECATED RDW RBC AUTO: 45.6 FL (ref 35.1–46.3)
EOSINOPHIL # BLD AUTO: 0.27 X10(3) UL (ref 0–0.7)
EOSINOPHIL NFR BLD AUTO: 2.7 %
ERYTHROCYTE [DISTWIDTH] IN BLOOD BY AUTOMATED COUNT: 14.2 % (ref 11–15)
GLUCOSE BLD-MCNC: 101 MG/DL (ref 70–99)
GLUCOSE UR-MCNC: NEGATIVE MG/DL
HCT VFR BLD AUTO: 53 %
HGB BLD-MCNC: 17.2 G/DL
HYALINE CASTS #/AREA URNS AUTO: PRESENT /LPF
IMM GRANULOCYTES # BLD AUTO: 0.07 X10(3) UL (ref 0–1)
IMM GRANULOCYTES NFR BLD: 0.7 %
KETONES UR-MCNC: NEGATIVE MG/DL
LEUKOCYTE ESTERASE UR QL STRIP.AUTO: NEGATIVE
LYMPHOCYTES # BLD AUTO: 2.98 X10(3) UL (ref 1–4)
LYMPHOCYTES NFR BLD AUTO: 29.9 %
MCH RBC QN AUTO: 28.3 PG (ref 26–34)
MCHC RBC AUTO-ENTMCNC: 32.5 G/DL (ref 31–37)
MCV RBC AUTO: 87.3 FL
MICROALBUMIN UR-MCNC: 890 MG/DL
MICROALBUMIN/CREAT 24H UR-RTO: 6180.6 UG/MG (ref ?–30)
MONOCYTES # BLD AUTO: 0.71 X10(3) UL (ref 0.1–1)
MONOCYTES NFR BLD AUTO: 7.1 %
NEUTROPHILS # BLD AUTO: 5.82 X10 (3) UL (ref 1.5–7.7)
NEUTROPHILS # BLD AUTO: 5.82 X10(3) UL (ref 1.5–7.7)
NEUTROPHILS NFR BLD AUTO: 58.5 %
NITRITE UR QL STRIP.AUTO: NEGATIVE
OSMOLALITY SERPL CALC.SUM OF ELEC: 287 MOSM/KG (ref 275–295)
PH UR: 6 [PH] (ref 5–8)
PHOSPHATE SERPL-MCNC: 3.2 MG/DL (ref 2.5–4.9)
PLATELET # BLD AUTO: 492 10(3)UL (ref 150–450)
POTASSIUM SERPL-SCNC: 4.7 MMOL/L (ref 3.5–5.1)
PROT UR-MCNC: >=500 MG/DL
RBC # BLD AUTO: 6.07 X10(6)UL
SODIUM SERPL-SCNC: 138 MMOL/L (ref 136–145)
SP GR UR STRIP: 1.02 (ref 1–1.03)
UROBILINOGEN UR STRIP-ACNC: <2
VIT C UR-MCNC: NEGATIVE MG/DL
WBC # BLD AUTO: 10 X10(3) UL (ref 4–11)

## 2022-05-06 PROCEDURE — 82043 UR ALBUMIN QUANTITATIVE: CPT

## 2022-05-06 PROCEDURE — 85025 COMPLETE CBC W/AUTO DIFF WBC: CPT

## 2022-05-06 PROCEDURE — 81001 URINALYSIS AUTO W/SCOPE: CPT

## 2022-05-06 PROCEDURE — 36415 COLL VENOUS BLD VENIPUNCTURE: CPT

## 2022-05-06 PROCEDURE — 80069 RENAL FUNCTION PANEL: CPT

## 2022-05-06 PROCEDURE — 82570 ASSAY OF URINE CREATININE: CPT

## 2022-05-06 PROCEDURE — 99214 OFFICE O/P EST MOD 30 MIN: CPT | Performed by: INTERNAL MEDICINE

## 2022-05-12 ENCOUNTER — PATIENT MESSAGE (OUTPATIENT)
Dept: INTERNAL MEDICINE CLINIC | Facility: CLINIC | Age: 62
End: 2022-05-12

## 2022-07-28 RX ORDER — AMLODIPINE BESYLATE 10 MG/1
TABLET ORAL
Qty: 90 TABLET | Refills: 3 | Status: SHIPPED | OUTPATIENT
Start: 2022-07-28

## 2022-07-28 RX ORDER — ATORVASTATIN CALCIUM 10 MG/1
10 TABLET, FILM COATED ORAL NIGHTLY
Qty: 90 TABLET | Refills: 3 | OUTPATIENT
Start: 2022-07-28

## 2022-07-28 RX ORDER — LISINOPRIL 20 MG/1
TABLET ORAL
Qty: 90 TABLET | Refills: 3 | Status: SHIPPED | OUTPATIENT
Start: 2022-07-28

## 2022-10-12 ENCOUNTER — TELEPHONE (OUTPATIENT)
Dept: HEMATOLOGY/ONCOLOGY | Facility: HOSPITAL | Age: 62
End: 2022-10-12

## 2022-11-10 ENCOUNTER — LAB ENCOUNTER (OUTPATIENT)
Dept: LAB | Facility: HOSPITAL | Age: 62
End: 2022-11-10
Attending: INTERNAL MEDICINE
Payer: COMMERCIAL

## 2022-11-10 DIAGNOSIS — N18.1 CHRONIC KIDNEY DISEASE, STAGE I: ICD-10-CM

## 2022-11-10 LAB
ALBUMIN SERPL-MCNC: 2.7 G/DL (ref 3.4–5)
ANION GAP SERPL CALC-SCNC: 6 MMOL/L (ref 0–18)
BILIRUB UR QL: NEGATIVE
BUN BLD-MCNC: 15 MG/DL (ref 7–18)
BUN/CREAT SERPL: 12 (ref 10–20)
CALCIUM BLD-MCNC: 9 MG/DL (ref 8.5–10.1)
CHLORIDE SERPL-SCNC: 104 MMOL/L (ref 98–112)
CLARITY UR: CLEAR
CO2 SERPL-SCNC: 27 MMOL/L (ref 21–32)
COLOR UR: YELLOW
CREAT BLD-MCNC: 1.25 MG/DL
CREAT UR-SCNC: 61.6 MG/DL
GFR SERPLBLD BASED ON 1.73 SQ M-ARVRAT: 64 ML/MIN/1.73M2 (ref 60–?)
GLUCOSE BLD-MCNC: 88 MG/DL (ref 70–99)
GLUCOSE UR-MCNC: >=500 MG/DL
KETONES UR-MCNC: NEGATIVE MG/DL
LEUKOCYTE ESTERASE UR QL STRIP.AUTO: NEGATIVE
MICROALBUMIN UR-MCNC: 365 MG/DL
MICROALBUMIN/CREAT 24H UR-RTO: 5925.3 UG/MG (ref ?–30)
NITRITE UR QL STRIP.AUTO: NEGATIVE
OSMOLALITY SERPL CALC.SUM OF ELEC: 284 MOSM/KG (ref 275–295)
PH UR: 6 [PH] (ref 5–8)
PHOSPHATE SERPL-MCNC: 3.4 MG/DL (ref 2.5–4.9)
POTASSIUM SERPL-SCNC: 4.5 MMOL/L (ref 3.5–5.1)
PROT UR-MCNC: >=500 MG/DL
RBC #/AREA URNS AUTO: >10 /HPF
SODIUM SERPL-SCNC: 137 MMOL/L (ref 136–145)
SP GR UR STRIP: 1.02 (ref 1–1.03)
UROBILINOGEN UR STRIP-ACNC: <2
VIT C UR-MCNC: NEGATIVE MG/DL

## 2022-11-10 PROCEDURE — 81001 URINALYSIS AUTO W/SCOPE: CPT

## 2022-11-10 PROCEDURE — 82570 ASSAY OF URINE CREATININE: CPT

## 2022-11-10 PROCEDURE — 80069 RENAL FUNCTION PANEL: CPT

## 2022-11-10 PROCEDURE — 36415 COLL VENOUS BLD VENIPUNCTURE: CPT

## 2022-11-10 PROCEDURE — 82043 UR ALBUMIN QUANTITATIVE: CPT

## 2022-11-11 DIAGNOSIS — N18.1 CKD (CHRONIC KIDNEY DISEASE), SYMPTOM MANAGEMENT ONLY, STAGE 1: Primary | ICD-10-CM

## 2022-12-09 ENCOUNTER — APPOINTMENT (OUTPATIENT)
Dept: HEMATOLOGY/ONCOLOGY | Facility: HOSPITAL | Age: 62
End: 2022-12-09
Attending: INTERNAL MEDICINE
Payer: COMMERCIAL

## 2023-01-03 ENCOUNTER — TELEPHONE (OUTPATIENT)
Dept: INTERNAL MEDICINE CLINIC | Facility: CLINIC | Age: 63
End: 2023-01-03

## 2023-01-03 ENCOUNTER — TELEPHONE (OUTPATIENT)
Dept: HEMATOLOGY/ONCOLOGY | Facility: HOSPITAL | Age: 63
End: 2023-01-03

## 2023-01-03 ENCOUNTER — HOSPITAL ENCOUNTER (OUTPATIENT)
Dept: CT IMAGING | Facility: HOSPITAL | Age: 63
Discharge: HOME | End: 2023-01-03
Attending: INTERNAL MEDICINE
Payer: COMMERCIAL

## 2023-01-03 DIAGNOSIS — R91.1 PULMONARY NODULE: ICD-10-CM

## 2023-01-03 NOTE — TELEPHONE ENCOUNTER
Pt called to ask Dr Josse acuñain on waiting to have CT scan until after April 1 when his insurance co pay will be $250 instead of out of pocket $2000 charge he would be charged to have CT done today     Pt advises this is the 3rd CT, last one was 5/4/22 and it was stable, to finish his monitoring     Pt is waiting at the hospital   CT was scheduled today at 10 am    [phone]    Pt also has a message in to Dr Angel Luis Andrea but has not heard back from his office

## 2023-01-03 NOTE — TELEPHONE ENCOUNTER
Patient says he went in for his scan this morning, but due to insurance reasons they told him it would cost him $2000. He is asking if it would be okay for him to wait until April to do this scan. He is asking for a call back. He says he is waiting in the hospital so he can talk to the  at diagnostics to reschedule.

## 2023-01-04 NOTE — TELEPHONE ENCOUNTER
Call placed to patient. He states that Capri Gee in our precert dept assisted him to schedule his CT scan at an outside facility ([address] [place] [address]) on Saturday 1/7 and he will bring in the disc on Monday 1/9 for uploading into our PACS system. He thanked me for the call back.

## 2023-01-05 ENCOUNTER — APPOINTMENT (OUTPATIENT)
Dept: HEMATOLOGY/ONCOLOGY | Facility: HOSPITAL | Age: 63
End: 2023-01-05
Attending: INTERNAL MEDICINE
Payer: COMMERCIAL

## 2023-01-09 ENCOUNTER — LAB ENCOUNTER (OUTPATIENT)
Dept: LAB | Facility: HOSPITAL | Age: 63
End: 2023-01-09
Attending: INTERNAL MEDICINE
Payer: COMMERCIAL

## 2023-01-09 ENCOUNTER — DOCUMENTATION ONLY (OUTPATIENT)
Dept: HEMATOLOGY/ONCOLOGY | Facility: HOSPITAL | Age: 63
End: 2023-01-09

## 2023-01-09 DIAGNOSIS — D75.1 ERYTHROCYTOSIS: ICD-10-CM

## 2023-01-09 LAB
BASOPHILS # BLD AUTO: 0.1 X10(3) UL (ref 0–0.2)
BASOPHILS NFR BLD AUTO: 0.9 %
DEPRECATED RDW RBC AUTO: 43.8 FL (ref 35.1–46.3)
EOSINOPHIL # BLD AUTO: 0.25 X10(3) UL (ref 0–0.7)
EOSINOPHIL NFR BLD AUTO: 2.2 %
ERYTHROCYTE [DISTWIDTH] IN BLOOD BY AUTOMATED COUNT: 13.6 % (ref 11–15)
HCT VFR BLD AUTO: 54.2 %
HGB BLD-MCNC: 18 G/DL
IMM GRANULOCYTES # BLD AUTO: 0.07 X10(3) UL (ref 0–1)
IMM GRANULOCYTES NFR BLD: 0.6 %
LYMPHOCYTES # BLD AUTO: 3.55 X10(3) UL (ref 1–4)
LYMPHOCYTES NFR BLD AUTO: 31.3 %
MCH RBC QN AUTO: 28.8 PG (ref 26–34)
MCHC RBC AUTO-ENTMCNC: 33.2 G/DL (ref 31–37)
MCV RBC AUTO: 86.9 FL
MONOCYTES # BLD AUTO: 0.96 X10(3) UL (ref 0.1–1)
MONOCYTES NFR BLD AUTO: 8.5 %
NEUTROPHILS # BLD AUTO: 6.4 X10 (3) UL (ref 1.5–7.7)
NEUTROPHILS # BLD AUTO: 6.4 X10(3) UL (ref 1.5–7.7)
NEUTROPHILS NFR BLD AUTO: 56.5 %
PLATELET # BLD AUTO: 458 10(3)UL (ref 150–450)
RBC # BLD AUTO: 6.24 X10(6)UL
WBC # BLD AUTO: 11.3 X10(3) UL (ref 4–11)

## 2023-01-09 PROCEDURE — 36415 COLL VENOUS BLD VENIPUNCTURE: CPT

## 2023-01-09 PROCEDURE — 85025 COMPLETE CBC W/AUTO DIFF WBC: CPT

## 2023-01-09 NOTE — PROGRESS NOTES
CT Chest report and Disc received from patient/[facility]. Report available for Dr Carolyn Roe review and CD disc uploaded by our  staff into pacs for review.

## 2023-01-10 ENCOUNTER — OFFICE VISIT (OUTPATIENT)
Dept: HEMATOLOGY/ONCOLOGY | Facility: HOSPITAL | Age: 63
End: 2023-01-10
Attending: INTERNAL MEDICINE
Payer: COMMERCIAL

## 2023-01-10 VITALS
RESPIRATION RATE: 18 BRPM | BODY MASS INDEX: 33.74 KG/M2 | HEIGHT: 71 IN | DIASTOLIC BLOOD PRESSURE: 84 MMHG | SYSTOLIC BLOOD PRESSURE: 147 MMHG | HEART RATE: 102 BPM | OXYGEN SATURATION: 97 % | TEMPERATURE: 99 F | WEIGHT: 241 LBS

## 2023-01-10 DIAGNOSIS — D75.839 THROMBOCYTOSIS: ICD-10-CM

## 2023-01-10 DIAGNOSIS — D75.1 ERYTHROCYTOSIS: Primary | ICD-10-CM

## 2023-01-10 DIAGNOSIS — R91.1 PULMONARY NODULE: ICD-10-CM

## 2023-01-10 PROCEDURE — 99214 OFFICE O/P EST MOD 30 MIN: CPT | Performed by: INTERNAL MEDICINE

## 2023-01-10 RX ORDER — DAPAGLIFLOZIN 10 MG/1
10 TABLET, FILM COATED ORAL DAILY
COMMUNITY
Start: 2022-12-28

## 2023-01-11 ENCOUNTER — TELEPHONE (OUTPATIENT)
Dept: INTERNAL MEDICINE CLINIC | Facility: CLINIC | Age: 63
End: 2023-01-11

## 2023-01-11 DIAGNOSIS — N04.9 NEPHROTIC SYNDROME: ICD-10-CM

## 2023-01-11 DIAGNOSIS — D75.1 ERYTHROCYTOSIS: Primary | ICD-10-CM

## 2023-01-11 NOTE — TELEPHONE ENCOUNTER
Pt. Called stating he now has [facility] [employer] and will need new referrals for Dr. Suzanne Chi and Dr. Zaira Zelaya. For Dr. Suzanne Chi, he needs a referral back dated for 1/2/23 when he saw her and another one for a February appt. For Dr. Zaira Zelaya he has an appt. With him in February and will need a referral.  He does have an upcoming appt. With Dr. Clark Allison for a colonoscopy and will need a referral for that also.

## 2023-01-18 RX ORDER — ATORVASTATIN CALCIUM 10 MG/1
TABLET, FILM COATED ORAL
Qty: 90 TABLET | Refills: 0 | Status: SHIPPED | OUTPATIENT
Start: 2023-01-18

## 2023-02-17 ENCOUNTER — LAB ENCOUNTER (OUTPATIENT)
Dept: LAB | Facility: HOSPITAL | Age: 63
End: 2023-02-17
Attending: INTERNAL MEDICINE
Payer: COMMERCIAL

## 2023-02-17 DIAGNOSIS — N18.32 STAGE 3B CHRONIC KIDNEY DISEASE (HCC): ICD-10-CM

## 2023-02-17 DIAGNOSIS — N18.1 CKD (CHRONIC KIDNEY DISEASE), SYMPTOM MANAGEMENT ONLY, STAGE 1: ICD-10-CM

## 2023-02-17 DIAGNOSIS — D75.1 ERYTHROCYTOSIS: ICD-10-CM

## 2023-02-17 DIAGNOSIS — D75.839 THROMBOCYTOSIS: ICD-10-CM

## 2023-02-17 DIAGNOSIS — R91.1 PULMONARY NODULE: ICD-10-CM

## 2023-02-17 DIAGNOSIS — N04.9 NEPHROTIC SYNDROME: ICD-10-CM

## 2023-02-17 LAB
ALBUMIN SERPL-MCNC: 2.6 G/DL (ref 3.4–5)
ANION GAP SERPL CALC-SCNC: 9 MMOL/L (ref 0–18)
BASOPHILS # BLD AUTO: 0.1 X10(3) UL (ref 0–0.2)
BASOPHILS NFR BLD AUTO: 0.9 %
BILIRUB UR QL: NEGATIVE
BUN BLD-MCNC: 22 MG/DL (ref 7–18)
BUN/CREAT SERPL: 22 (ref 10–20)
CALCIUM BLD-MCNC: 9.1 MG/DL (ref 8.5–10.1)
CHLORIDE SERPL-SCNC: 106 MMOL/L (ref 98–112)
CO2 SERPL-SCNC: 25 MMOL/L (ref 21–32)
COLOR UR: YELLOW
CREAT BLD-MCNC: 1 MG/DL
CREAT UR-SCNC: 109 MG/DL
CREAT UR-SCNC: 109 MG/DL
DEPRECATED RDW RBC AUTO: 43.8 FL (ref 35.1–46.3)
EOSINOPHIL # BLD AUTO: 0.3 X10(3) UL (ref 0–0.7)
EOSINOPHIL NFR BLD AUTO: 2.8 %
ERYTHROCYTE [DISTWIDTH] IN BLOOD BY AUTOMATED COUNT: 13.8 % (ref 11–15)
GFR SERPLBLD BASED ON 1.73 SQ M-ARVRAT: 84 ML/MIN/1.73M2 (ref 60–?)
GLUCOSE BLD-MCNC: 94 MG/DL (ref 70–99)
GLUCOSE UR-MCNC: >=500 MG/DL
HCT VFR BLD AUTO: 53.7 %
HGB BLD-MCNC: 17.7 G/DL
IMM GRANULOCYTES # BLD AUTO: 0.08 X10(3) UL (ref 0–1)
IMM GRANULOCYTES NFR BLD: 0.8 %
KETONES UR-MCNC: NEGATIVE MG/DL
LEUKOCYTE ESTERASE UR QL STRIP.AUTO: NEGATIVE
LYMPHOCYTES # BLD AUTO: 3.56 X10(3) UL (ref 1–4)
LYMPHOCYTES NFR BLD AUTO: 33.5 %
MCH RBC QN AUTO: 28.4 PG (ref 26–34)
MCHC RBC AUTO-ENTMCNC: 33 G/DL (ref 31–37)
MCV RBC AUTO: 86.1 FL
MICROALBUMIN UR-MCNC: 995 MG/DL
MICROALBUMIN/CREAT 24H UR-RTO: 9128.4 UG/MG (ref ?–30)
MONOCYTES # BLD AUTO: 1.01 X10(3) UL (ref 0.1–1)
MONOCYTES NFR BLD AUTO: 9.5 %
NEUTROPHILS # BLD AUTO: 5.58 X10 (3) UL (ref 1.5–7.7)
NEUTROPHILS # BLD AUTO: 5.58 X10(3) UL (ref 1.5–7.7)
NEUTROPHILS NFR BLD AUTO: 52.5 %
NITRITE UR QL STRIP.AUTO: NEGATIVE
OSMOLALITY SERPL CALC.SUM OF ELEC: 293 MOSM/KG (ref 275–295)
PH UR: 5 [PH] (ref 5–8)
PHOSPHATE SERPL-MCNC: 3.9 MG/DL (ref 2.5–4.9)
PLATELET # BLD AUTO: 490 10(3)UL (ref 150–450)
POTASSIUM SERPL-SCNC: 4.5 MMOL/L (ref 3.5–5.1)
PROT UR-MCNC: 2081.2 MG/DL
PROT UR-MCNC: >=500 MG/DL
PROT/CREAT UR-RTO: 19.09
RBC # BLD AUTO: 6.24 X10(6)UL
SODIUM SERPL-SCNC: 140 MMOL/L (ref 136–145)
SP GR UR STRIP: 1.02 (ref 1–1.03)
UROBILINOGEN UR STRIP-ACNC: <2
VIT C UR-MCNC: NEGATIVE MG/DL
WBC # BLD AUTO: 10.6 X10(3) UL (ref 4–11)

## 2023-02-17 PROCEDURE — 36415 COLL VENOUS BLD VENIPUNCTURE: CPT

## 2023-02-17 PROCEDURE — 81001 URINALYSIS AUTO W/SCOPE: CPT

## 2023-02-17 PROCEDURE — 82570 ASSAY OF URINE CREATININE: CPT

## 2023-02-17 PROCEDURE — 82043 UR ALBUMIN QUANTITATIVE: CPT

## 2023-02-17 PROCEDURE — 82668 ASSAY OF ERYTHROPOIETIN: CPT

## 2023-02-17 PROCEDURE — 85025 COMPLETE CBC W/AUTO DIFF WBC: CPT

## 2023-02-17 PROCEDURE — 84156 ASSAY OF PROTEIN URINE: CPT

## 2023-02-17 PROCEDURE — 80069 RENAL FUNCTION PANEL: CPT

## 2023-02-20 LAB — ERYTHROPOIETIN (EPO): 6 MU/ML

## 2023-02-21 DIAGNOSIS — R80.9 PROTEINURIA, UNSPECIFIED TYPE: Primary | ICD-10-CM

## 2023-02-24 ENCOUNTER — OFFICE VISIT (OUTPATIENT)
Dept: HEMATOLOGY/ONCOLOGY | Facility: HOSPITAL | Age: 63
End: 2023-02-24
Attending: INTERNAL MEDICINE
Payer: COMMERCIAL

## 2023-02-24 ENCOUNTER — LAB ENCOUNTER (OUTPATIENT)
Dept: LAB | Facility: HOSPITAL | Age: 63
End: 2023-02-24
Attending: INTERNAL MEDICINE
Payer: COMMERCIAL

## 2023-02-24 VITALS
OXYGEN SATURATION: 97 % | DIASTOLIC BLOOD PRESSURE: 80 MMHG | HEIGHT: 71 IN | SYSTOLIC BLOOD PRESSURE: 139 MMHG | RESPIRATION RATE: 18 BRPM | WEIGHT: 239 LBS | HEART RATE: 80 BPM | BODY MASS INDEX: 33.46 KG/M2 | TEMPERATURE: 98 F

## 2023-02-24 DIAGNOSIS — R80.9 PROTEINURIA, UNSPECIFIED TYPE: ICD-10-CM

## 2023-02-24 DIAGNOSIS — R91.1 PULMONARY NODULE: Primary | ICD-10-CM

## 2023-02-24 DIAGNOSIS — D75.1 ERYTHROCYTOSIS: ICD-10-CM

## 2023-02-24 LAB
BILIRUB UR QL: NEGATIVE
CLARITY UR: CLEAR
COLOR UR: YELLOW
CREAT UR-SCNC: 48.3 MG/DL
GLUCOSE UR-MCNC: >=500 MG/DL
KETONES UR-MCNC: NEGATIVE MG/DL
LEUKOCYTE ESTERASE UR QL STRIP.AUTO: NEGATIVE
MICROALBUMIN UR-MCNC: 295 MG/DL
MICROALBUMIN/CREAT 24H UR-RTO: 6107.7 UG/MG (ref ?–30)
NITRITE UR QL STRIP.AUTO: NEGATIVE
PH UR: 6 [PH] (ref 5–8)
PROT UR-MCNC: >=500 MG/DL
SP GR UR STRIP: 1.01 (ref 1–1.03)
UROBILINOGEN UR STRIP-ACNC: <2
VIT C UR-MCNC: NEGATIVE MG/DL

## 2023-02-24 PROCEDURE — 82570 ASSAY OF URINE CREATININE: CPT

## 2023-02-24 PROCEDURE — 81001 URINALYSIS AUTO W/SCOPE: CPT

## 2023-02-24 PROCEDURE — 99214 OFFICE O/P EST MOD 30 MIN: CPT | Performed by: INTERNAL MEDICINE

## 2023-02-24 PROCEDURE — 82043 UR ALBUMIN QUANTITATIVE: CPT

## 2023-03-01 ENCOUNTER — OFFICE VISIT (OUTPATIENT)
Dept: INTERNAL MEDICINE CLINIC | Facility: CLINIC | Age: 63
End: 2023-03-01

## 2023-03-01 ENCOUNTER — LAB ENCOUNTER (OUTPATIENT)
Dept: LAB | Age: 63
End: 2023-03-01
Attending: INTERNAL MEDICINE
Payer: COMMERCIAL

## 2023-03-01 VITALS
HEIGHT: 71 IN | BODY MASS INDEX: 33.04 KG/M2 | OXYGEN SATURATION: 97 % | WEIGHT: 236 LBS | DIASTOLIC BLOOD PRESSURE: 82 MMHG | SYSTOLIC BLOOD PRESSURE: 120 MMHG | HEART RATE: 85 BPM

## 2023-03-01 DIAGNOSIS — N04.9 NEPHROTIC SYNDROME: ICD-10-CM

## 2023-03-01 DIAGNOSIS — R91.1 LUNG NODULE: ICD-10-CM

## 2023-03-01 DIAGNOSIS — I10 ESSENTIAL HYPERTENSION: ICD-10-CM

## 2023-03-01 DIAGNOSIS — D75.1 ERYTHROCYTOSIS: ICD-10-CM

## 2023-03-01 DIAGNOSIS — Z00.00 PHYSICAL EXAM: ICD-10-CM

## 2023-03-01 DIAGNOSIS — Z72.0 TOBACCO ABUSE: ICD-10-CM

## 2023-03-01 DIAGNOSIS — K57.90 DIVERTICULOSIS: ICD-10-CM

## 2023-03-01 DIAGNOSIS — Z00.00 PHYSICAL EXAM: Primary | ICD-10-CM

## 2023-03-01 LAB
ALBUMIN SERPL-MCNC: 2.8 G/DL (ref 3.4–5)
ALBUMIN/GLOB SERPL: 0.7 {RATIO} (ref 1–2)
ALP LIVER SERPL-CCNC: 79 U/L
ALT SERPL-CCNC: 25 U/L
ANION GAP SERPL CALC-SCNC: 1 MMOL/L (ref 0–18)
AST SERPL-CCNC: 24 U/L (ref 15–37)
BILIRUB SERPL-MCNC: 0.4 MG/DL (ref 0.1–2)
BUN BLD-MCNC: 17 MG/DL (ref 7–18)
BUN/CREAT SERPL: 16.5 (ref 10–20)
CALCIUM BLD-MCNC: 9.1 MG/DL (ref 8.5–10.1)
CHLORIDE SERPL-SCNC: 109 MMOL/L (ref 98–112)
CHOLEST SERPL-MCNC: 142 MG/DL (ref ?–200)
CO2 SERPL-SCNC: 27 MMOL/L (ref 21–32)
COMPLEXED PSA SERPL-MCNC: 0.74 NG/ML (ref ?–4)
CREAT BLD-MCNC: 1.03 MG/DL
FASTING PATIENT LIPID ANSWER: YES
FASTING STATUS PATIENT QL REPORTED: YES
GFR SERPLBLD BASED ON 1.73 SQ M-ARVRAT: 81 ML/MIN/1.73M2 (ref 60–?)
GLOBULIN PLAS-MCNC: 4.3 G/DL (ref 2.8–4.4)
GLUCOSE BLD-MCNC: 88 MG/DL (ref 70–99)
HDLC SERPL-MCNC: 45 MG/DL (ref 40–59)
LDLC SERPL CALC-MCNC: 66 MG/DL (ref ?–100)
NONHDLC SERPL-MCNC: 97 MG/DL (ref ?–130)
OSMOLALITY SERPL CALC.SUM OF ELEC: 285 MOSM/KG (ref 275–295)
POTASSIUM SERPL-SCNC: 4.8 MMOL/L (ref 3.5–5.1)
PROT SERPL-MCNC: 7.1 G/DL (ref 6.4–8.2)
SODIUM SERPL-SCNC: 137 MMOL/L (ref 136–145)
TRIGL SERPL-MCNC: 189 MG/DL (ref 30–149)
TSI SER-ACNC: 1.68 MIU/ML (ref 0.36–3.74)
VLDLC SERPL CALC-MCNC: 28 MG/DL (ref 0–30)

## 2023-03-01 PROCEDURE — 80061 LIPID PANEL: CPT

## 2023-03-01 PROCEDURE — 3008F BODY MASS INDEX DOCD: CPT | Performed by: INTERNAL MEDICINE

## 2023-03-01 PROCEDURE — 90677 PCV20 VACCINE IM: CPT | Performed by: INTERNAL MEDICINE

## 2023-03-01 PROCEDURE — 99396 PREV VISIT EST AGE 40-64: CPT | Performed by: INTERNAL MEDICINE

## 2023-03-01 PROCEDURE — 90471 IMMUNIZATION ADMIN: CPT | Performed by: INTERNAL MEDICINE

## 2023-03-01 PROCEDURE — 3079F DIAST BP 80-89 MM HG: CPT | Performed by: INTERNAL MEDICINE

## 2023-03-01 PROCEDURE — 84443 ASSAY THYROID STIM HORMONE: CPT

## 2023-03-01 PROCEDURE — 3074F SYST BP LT 130 MM HG: CPT | Performed by: INTERNAL MEDICINE

## 2023-03-01 PROCEDURE — 36415 COLL VENOUS BLD VENIPUNCTURE: CPT

## 2023-03-01 PROCEDURE — 80053 COMPREHEN METABOLIC PANEL: CPT

## 2023-03-01 PROCEDURE — 83036 HEMOGLOBIN GLYCOSYLATED A1C: CPT

## 2023-03-01 RX ORDER — ATORVASTATIN CALCIUM 10 MG/1
10 TABLET, FILM COATED ORAL NIGHTLY
Qty: 90 TABLET | Refills: 3 | Status: SHIPPED | OUTPATIENT
Start: 2023-03-01

## 2023-03-02 ENCOUNTER — PATIENT MESSAGE (OUTPATIENT)
Dept: INTERNAL MEDICINE CLINIC | Facility: CLINIC | Age: 63
End: 2023-03-02

## 2023-03-02 LAB
EST. AVERAGE GLUCOSE BLD GHB EST-MCNC: 117 MG/DL (ref 68–126)
HBA1C MFR BLD: 5.7 % (ref ?–5.7)

## 2023-04-16 RX ORDER — ATORVASTATIN CALCIUM 10 MG/1
TABLET, FILM COATED ORAL
Qty: 90 TABLET | Refills: 3 | OUTPATIENT
Start: 2023-04-16

## 2023-05-19 RX ORDER — AMLODIPINE BESYLATE 10 MG/1
TABLET ORAL
Qty: 90 TABLET | Refills: 3 | OUTPATIENT
Start: 2023-05-19

## 2023-05-19 RX ORDER — LISINOPRIL 20 MG/1
TABLET ORAL
Qty: 90 TABLET | Refills: 3 | OUTPATIENT
Start: 2023-05-19

## 2023-05-19 RX ORDER — AMLODIPINE BESYLATE 10 MG/1
10 TABLET ORAL DAILY
Qty: 90 TABLET | Refills: 3 | OUTPATIENT
Start: 2023-05-19

## 2023-05-19 NOTE — TELEPHONE ENCOUNTER
Spoke to patient and advised he should have enough refill til the end of July. Patient will call [facility].     Amlodipine

## 2023-05-26 RX ORDER — LISINOPRIL 20 MG/1
TABLET ORAL
Qty: 90 TABLET | Refills: 3 | OUTPATIENT
Start: 2023-05-26

## 2023-06-05 ENCOUNTER — TELEPHONE (OUTPATIENT)
Facility: CLINIC | Age: 63
End: 2023-06-05

## 2023-06-05 DIAGNOSIS — N18.9 CHRONIC KIDNEY DISEASE, UNSPECIFIED CKD STAGE: Primary | ICD-10-CM

## 2023-06-06 ENCOUNTER — LAB ENCOUNTER (OUTPATIENT)
Dept: LAB | Facility: REFERENCE LAB | Age: 63
End: 2023-06-06
Attending: INTERNAL MEDICINE
Payer: MEDICARE

## 2023-06-06 DIAGNOSIS — N18.9 CHRONIC KIDNEY DISEASE, UNSPECIFIED CKD STAGE: ICD-10-CM

## 2023-06-06 LAB
ALBUMIN SERPL-MCNC: 2.9 G/DL (ref 3.4–5)
ANION GAP SERPL CALC-SCNC: 6 MMOL/L (ref 0–18)
BILIRUB UR QL: NEGATIVE
BUN BLD-MCNC: 18 MG/DL (ref 7–18)
BUN/CREAT SERPL: 20 (ref 10–20)
CALCIUM BLD-MCNC: 9.1 MG/DL (ref 8.5–10.1)
CHLORIDE SERPL-SCNC: 104 MMOL/L (ref 98–112)
CLARITY UR: CLEAR
CO2 SERPL-SCNC: 28 MMOL/L (ref 21–32)
COLOR UR: COLORLESS
CREAT BLD-MCNC: 0.9 MG/DL
CREAT UR-SCNC: 27.9 MG/DL
GFR SERPLBLD BASED ON 1.73 SQ M-ARVRAT: 95 ML/MIN/1.73M2 (ref 60–?)
GLUCOSE BLD-MCNC: 86 MG/DL (ref 70–99)
GLUCOSE UR-MCNC: 100 MG/DL
KETONES UR-MCNC: NEGATIVE MG/DL
LEUKOCYTE ESTERASE UR QL STRIP.AUTO: NEGATIVE
MICROALBUMIN UR-MCNC: 178 MG/DL
MICROALBUMIN/CREAT 24H UR-RTO: 6379.9 UG/MG (ref ?–30)
NITRITE UR QL STRIP.AUTO: NEGATIVE
OSMOLALITY SERPL CALC.SUM OF ELEC: 287 MOSM/KG (ref 275–295)
PH UR: 6 [PH] (ref 5–8)
PHOSPHATE SERPL-MCNC: 3.7 MG/DL (ref 2.5–4.9)
POTASSIUM SERPL-SCNC: 4.7 MMOL/L (ref 3.5–5.1)
PROT UR-MCNC: 200 MG/DL
SODIUM SERPL-SCNC: 138 MMOL/L (ref 136–145)
SP GR UR STRIP: 1.01 (ref 1–1.03)
UROBILINOGEN UR STRIP-ACNC: NORMAL

## 2023-06-06 PROCEDURE — 82570 ASSAY OF URINE CREATININE: CPT

## 2023-06-06 PROCEDURE — 81001 URINALYSIS AUTO W/SCOPE: CPT

## 2023-06-06 PROCEDURE — 36415 COLL VENOUS BLD VENIPUNCTURE: CPT

## 2023-06-06 PROCEDURE — 80069 RENAL FUNCTION PANEL: CPT

## 2023-06-06 PROCEDURE — 82043 UR ALBUMIN QUANTITATIVE: CPT

## 2023-06-06 NOTE — TELEPHONE ENCOUNTER
Patient checking status on orders - has appt at 2pm to have labs done. Please call once orders are avail - ok to leave detailed message. Thank you.

## 2023-06-08 ENCOUNTER — OFFICE VISIT (OUTPATIENT)
Facility: CLINIC | Age: 63
End: 2023-06-08

## 2023-06-08 VITALS
SYSTOLIC BLOOD PRESSURE: 125 MMHG | HEART RATE: 92 BPM | HEIGHT: 71 IN | WEIGHT: 236 LBS | BODY MASS INDEX: 33.04 KG/M2 | DIASTOLIC BLOOD PRESSURE: 82 MMHG

## 2023-06-08 DIAGNOSIS — I10 HYPERTENSION, UNSPECIFIED TYPE: ICD-10-CM

## 2023-06-08 DIAGNOSIS — N18.1 STAGE 1 CHRONIC KIDNEY DISEASE: ICD-10-CM

## 2023-06-08 DIAGNOSIS — R80.9 PROTEINURIA, UNSPECIFIED TYPE: Primary | ICD-10-CM

## 2023-08-17 RX ORDER — AMLODIPINE BESYLATE 10 MG/1
TABLET ORAL
Qty: 90 TABLET | Refills: 3 | Status: SHIPPED | OUTPATIENT
Start: 2023-08-17

## 2023-08-17 NOTE — TELEPHONE ENCOUNTER
Refill request is for a maintenance medication and has met the criteria specified in the Ambulatory Medication Refill Standing Order for eligibility, visits, laboratory, alerts and was sent to the requested pharmacy.     Requested Prescriptions     Signed Prescriptions Disp Refills    AMLODIPINE 10 MG Oral Tab 90 tablet 3     Sig: TAKE 1 TABLET(10 MG) BY MOUTH DAILY     Authorizing Provider: Leatha Head     Ordering User: Zahra Luo

## 2023-09-26 RX ORDER — LISINOPRIL 20 MG/1
TABLET ORAL
Qty: 90 TABLET | Refills: 3 | Status: SHIPPED | OUTPATIENT
Start: 2023-09-26

## 2023-09-26 RX ORDER — LISINOPRIL 20 MG/1
TABLET ORAL
Qty: 90 TABLET | Refills: 3 | OUTPATIENT
Start: 2023-09-26

## 2023-09-26 NOTE — TELEPHONE ENCOUNTER
Refill request is for a maintenance medication and has met the criteria specified in the Ambulatory Medication Refill Standing Order for eligibility, visits, laboratory, alerts and was sent to the requested pharmacy.     Requested Prescriptions     Signed Prescriptions Disp Refills    LISINOPRIL 20 MG Oral Tab 90 tablet 3     Sig: TAKE 1 TABLET(20 MG) BY MOUTH DAILY     Authorizing Provider: Odalys Park     Ordering User: Shalom Negrete

## 2023-09-26 NOTE — TELEPHONE ENCOUNTER
Current refill request refused due to refill is either a duplicate request or has active refills at the pharmacy. Check previous templates.     Requested Prescriptions     Refused Prescriptions Disp Refills    LISINOPRIL 20 MG Oral Tab [Pharmacy Med Name: LISINOPRIL 20MG TABLETS] 90 tablet 3     Sig: TAKE 1 TABLET(20 MG) BY MOUTH DAILY     Refused By: Marcus Weinberg     Reason for Refusal: Request already responded to by other means (e.g. phone or fax)

## 2023-11-18 ENCOUNTER — LAB ENCOUNTER (OUTPATIENT)
Dept: LAB | Facility: HOSPITAL | Age: 63
End: 2023-11-18
Attending: INTERNAL MEDICINE
Payer: MEDICARE

## 2023-11-18 DIAGNOSIS — N18.1 STAGE 1 CHRONIC KIDNEY DISEASE: ICD-10-CM

## 2023-11-18 DIAGNOSIS — R80.9 PROTEINURIA, UNSPECIFIED TYPE: ICD-10-CM

## 2023-11-18 LAB
ALBUMIN SERPL-MCNC: 3.7 G/DL (ref 3.2–4.8)
ANION GAP SERPL CALC-SCNC: 2 MMOL/L (ref 0–18)
BILIRUB UR QL: NEGATIVE
BUN BLD-MCNC: 11 MG/DL (ref 9–23)
BUN/CREAT SERPL: 12.4 (ref 10–20)
CALCIUM BLD-MCNC: 9.2 MG/DL (ref 8.7–10.4)
CHLORIDE SERPL-SCNC: 105 MMOL/L (ref 98–112)
CLARITY UR: CLEAR
CO2 SERPL-SCNC: 28 MMOL/L (ref 21–32)
CREAT BLD-MCNC: 0.89 MG/DL
CREAT UR-SCNC: 71.9 MG/DL
EGFRCR SERPLBLD CKD-EPI 2021: 95 ML/MIN/1.73M2 (ref 60–?)
GLUCOSE BLD-MCNC: 97 MG/DL (ref 70–99)
GLUCOSE UR-MCNC: >1000 MG/DL
KETONES UR-MCNC: NEGATIVE MG/DL
LEUKOCYTE ESTERASE UR QL STRIP.AUTO: NEGATIVE
MICROALBUMIN UR-MCNC: >380 MG/DL
NITRITE UR QL STRIP.AUTO: NEGATIVE
OSMOLALITY SERPL CALC.SUM OF ELEC: 279 MOSM/KG (ref 275–295)
PH UR: 6.5 [PH] (ref 5–8)
PHOSPHATE SERPL-MCNC: 4 MG/DL (ref 2.4–5.1)
POTASSIUM SERPL-SCNC: 5 MMOL/L (ref 3.5–5.1)
PROT UR-MCNC: 300 MG/DL
SODIUM SERPL-SCNC: 135 MMOL/L (ref 136–145)
SP GR UR STRIP: 1.02 (ref 1–1.03)
UROBILINOGEN UR STRIP-ACNC: NORMAL

## 2023-11-18 PROCEDURE — 82570 ASSAY OF URINE CREATININE: CPT

## 2023-11-18 PROCEDURE — 82043 UR ALBUMIN QUANTITATIVE: CPT

## 2023-11-18 PROCEDURE — 36415 COLL VENOUS BLD VENIPUNCTURE: CPT

## 2023-11-18 PROCEDURE — 81001 URINALYSIS AUTO W/SCOPE: CPT

## 2023-11-18 PROCEDURE — 80069 RENAL FUNCTION PANEL: CPT

## 2023-11-20 ENCOUNTER — TELEPHONE (OUTPATIENT)
Facility: CLINIC | Age: 63
End: 2023-11-20

## 2023-11-20 DIAGNOSIS — N18.9 CHRONIC KIDNEY DISEASE, UNSPECIFIED CKD STAGE: Primary | ICD-10-CM

## 2023-11-20 NOTE — TELEPHONE ENCOUNTER
Dr Sheriff Cons April schedule still not open,advised pt to call on do thru [place] in January ,pt verbalized understanding.

## 2023-11-20 NOTE — TELEPHONE ENCOUNTER
I spoke to the patient about his labs. They did not specifically calculate the urine protein, but his albumin has now normalized. I told him to stay on the lisinopril and the [facility] for now. He will repeat labs for April. Would you please call him and set up an appointment for April?   Thank you so much

## 2024-01-19 ENCOUNTER — APPOINTMENT (OUTPATIENT)
Dept: HEMATOLOGY/ONCOLOGY | Facility: HOSPITAL | Age: 64
End: 2024-01-19
Attending: INTERNAL MEDICINE
Payer: MEDICARE

## 2024-01-22 ENCOUNTER — HOSPITAL ENCOUNTER (OUTPATIENT)
Dept: CT IMAGING | Facility: HOSPITAL | Age: 64
Discharge: HOME | End: 2024-01-22
Attending: INTERNAL MEDICINE
Payer: MEDICARE

## 2024-01-22 ENCOUNTER — LAB ENCOUNTER (OUTPATIENT)
Dept: LAB | Facility: HOSPITAL | Age: 64
End: 2024-01-22
Attending: INTERNAL MEDICINE
Payer: MEDICARE

## 2024-01-22 DIAGNOSIS — R91.1 PULMONARY NODULE: ICD-10-CM

## 2024-01-22 DIAGNOSIS — D75.1 ERYTHROCYTOSIS: ICD-10-CM

## 2024-01-22 LAB
BASOPHILS # BLD AUTO: 0.09 X10(3) UL (ref 0–0.2)
BASOPHILS NFR BLD AUTO: 0.7 %
DEPRECATED RDW RBC AUTO: 42.9 FL (ref 35.1–46.3)
EOSINOPHIL # BLD AUTO: 0.32 X10(3) UL (ref 0–0.7)
EOSINOPHIL NFR BLD AUTO: 2.5 %
ERYTHROCYTE [DISTWIDTH] IN BLOOD BY AUTOMATED COUNT: 14 % (ref 11–15)
HCT VFR BLD AUTO: 52.1 %
HGB BLD-MCNC: 18 G/DL
IMM GRANULOCYTES # BLD AUTO: 0.08 X10(3) UL (ref 0–1)
IMM GRANULOCYTES NFR BLD: 0.6 %
LYMPHOCYTES # BLD AUTO: 3.36 X10(3) UL (ref 1–4)
LYMPHOCYTES NFR BLD AUTO: 26.5 %
MCH RBC QN AUTO: 29.1 PG (ref 26–34)
MCHC RBC AUTO-ENTMCNC: 34.5 G/DL (ref 31–37)
MCV RBC AUTO: 84.2 FL
MONOCYTES # BLD AUTO: 0.97 X10(3) UL (ref 0.1–1)
MONOCYTES NFR BLD AUTO: 7.6 %
NEUTROPHILS # BLD AUTO: 7.86 X10 (3) UL (ref 1.5–7.7)
NEUTROPHILS # BLD AUTO: 7.86 X10(3) UL (ref 1.5–7.7)
NEUTROPHILS NFR BLD AUTO: 62.1 %
PLATELET # BLD AUTO: 490 10(3)UL (ref 150–450)
RBC # BLD AUTO: 6.19 X10(6)UL
WBC # BLD AUTO: 12.7 X10(3) UL (ref 4–11)

## 2024-01-22 PROCEDURE — 36415 COLL VENOUS BLD VENIPUNCTURE: CPT

## 2024-01-22 PROCEDURE — 71250 CT THORAX DX C-: CPT | Performed by: INTERNAL MEDICINE

## 2024-01-22 PROCEDURE — 85025 COMPLETE CBC W/AUTO DIFF WBC: CPT

## 2024-01-29 ENCOUNTER — OFFICE VISIT (OUTPATIENT)
Dept: HEMATOLOGY/ONCOLOGY | Facility: HOSPITAL | Age: 64
End: 2024-01-29
Attending: INTERNAL MEDICINE
Payer: MEDICARE

## 2024-01-29 VITALS
DIASTOLIC BLOOD PRESSURE: 98 MMHG | TEMPERATURE: 98 F | OXYGEN SATURATION: 95 % | HEART RATE: 106 BPM | HEIGHT: 71 IN | BODY MASS INDEX: 32.76 KG/M2 | WEIGHT: 234 LBS | SYSTOLIC BLOOD PRESSURE: 139 MMHG | RESPIRATION RATE: 18 BRPM

## 2024-01-29 DIAGNOSIS — R91.1 PULMONARY NODULE: Primary | ICD-10-CM

## 2024-01-29 DIAGNOSIS — D47.1 MYELOPROLIFERATIVE DISORDER (HCC): ICD-10-CM

## 2024-01-29 DIAGNOSIS — D47.1 MYELOPROLIFERATIVE DISORDER (HCC): Primary | ICD-10-CM

## 2024-01-29 DIAGNOSIS — D75.1 ERYTHROCYTOSIS: ICD-10-CM

## 2024-01-29 PROCEDURE — 99211 OFF/OP EST MAY X REQ PHY/QHP: CPT

## 2024-01-29 NOTE — PROGRESS NOTES
Cancer Center Progress Note    Patient Name: Aly Mott   YOB: 1960   Medical Record Number: [id number]   Attending Physician: Adilson Davis M.D.     Chief Complaint:  Erythrocytosis, thrombocytosis, pulm nodule    History of Present Illness:  Cancer history:  63 year old   male, current smoker, being evaluated by Hematology for erythrocytosis.  He also has a history of a right middle lobe pulmonary nodule.  The patient had a CBC through his primary care physician 02/09/2022 where his hemoglobin was found to be 18.0, his platelet count was 452,000 and white blood cell count 8000.  He had a hemoglobin of 17.4 at [facility] January 2021.  Further labs in our system, including July 2021 show hemoglobin of 16.7, platelets 474,000, white blood cell count 10,000.     Myeloproliferative testing including JAK2 V6 17 F exon 12 through 14 negative from 2021    With regard to his pulmonary nodule, he had a CT of the chest 08/24/2021 that showed a 0.9 cm right middle lobe nodule.  He underwent a PET/CT 09/30/2021 and the nodule did not have any hypermetabolic activity    Interval history:  The patient returns for routine follow-up he is doing well overall denies any fevers or chills denies denies with intentional weight loss denies any hemoptysis/cough.  Energy level is good      Performance Status:  ECOG 0  Past Medical History:  Past Medical History:   Diagnosis Date    Diverticulosis     Essential hypertension     Hemorrhoid     High blood pressure     High cholesterol     History of blood in urine     Shows up in U/A    PONV (postoperative nausea and vomiting)     family hx    Renal disorder     recent protein in urine 2/9/22 - monitoring and following up after surgery    Visual impairment     glasses       Past Surgical History:  Past Surgical History:   Procedure Laterality Date    CHEST X-RAY 1 VW      1/19/21 at [place]     COLECTOMY  02/23/2022    LAPAROSCOPIC SIGMOID COLECTOMY POSSIBLE OPEN     COLONOSCOPY  2021    Hx of Polyps (Benign); No family Hx of Colon CA. 1st COLON at 49yo at 61yo    COLONOSCOPY      EKG TRACING FOR INITIAL PREV  2021 with [place]    STRESS TEST      21 [place]        Family History:  Family History   Problem Relation Age of Onset    Heart Disorder Father     Hypertension Father     Other (CAD) Father     Hypertension Mother     Dementia Mother     Heart Attack Mother     Other (DEAFNESS) Mother     Diabetes Maternal Grandfather     Stroke Maternal Grandfather        Social History:  Social History     Socioeconomic History    Marital status: Life Partner   Tobacco Use    Smoking status: Some Days     Years: 37     Types: Cigarettes, Cigars     Last attempt to quit: 2022     Years since quittin.9    Smokeless tobacco: Never    Tobacco comments:     Smoked 1 - 1.5 pack daily in past. 1.5 packs weekly..   Vaping Use    Vaping Use: Never used   Substance and Sexual Activity    Alcohol use: Yes     Alcohol/week: 0.0 - 3.0 standard drinks of alcohol     Comment: per week    Drug use: Yes     Frequency: 7.0 times per week     Types: Cannabis     Comment: 1/2 cigarrete per day    Sexual activity: Yes     Partners: Female         Current Medications:    Current Outpatient Medications:     LISINOPRIL 20 MG Oral Tab, TAKE 1 TABLET(20 MG) BY MOUTH DAILY, Disp: 90 tablet, Rfl: 3    AMLODIPINE 10 MG Oral Tab, TAKE 1 TABLET(10 MG) BY MOUTH DAILY, Disp: 90 tablet, Rfl: 3    atorvastatin 10 MG Oral Tab, Take 1 tablet (10 mg total) by mouth nightly., Disp: 90 tablet, Rfl: 3    FARXIGA 10 MG Oral Tab, Take 1 tablet (10 mg total) by mouth daily., Disp: , Rfl:     Current Outpatient Medications on File Prior to Visit   Medication Sig Dispense Refill    LISINOPRIL 20 MG Oral Tab TAKE 1 TABLET(20 MG) BY MOUTH DAILY 90 tablet 3    AMLODIPINE 10 MG Oral Tab TAKE 1 TABLET(10 MG) BY MOUTH DAILY 90 tablet 3    atorvastatin 10 MG Oral Tab Take 1 tablet (10 mg total) by mouth  nightly. 90 tablet 3    FARXIGA 10 MG Oral Tab Take 1 tablet (10 mg total) by mouth daily.       No current facility-administered medications on file prior to visit.         Allergies:  Allergies   Allergen Reactions    Aspirin SWELLING    Sulfa Antibiotics OTHER (SEE COMMENTS)     GI UPSET     Hydrochlorothiazide OTHER (SEE COMMENTS)     Indigestion after dinner, went away after 2 large glasses of drinking water         Review of Systems:  All other systems reviewed and negative x12    Vital Signs:  BP (!) 139/98 (BP Location: Left arm, Patient Position: Sitting, Cuff Size: adult)   Pulse 106   Temp 98.2 °F (36.8 °C) (Oral)   Resp 18   Ht 1.803 m (5' 11\")   Wt 106.1 kg (234 lb)   SpO2 95%   BMI 32.64 kg/m²     Physical Examination:  General: Patient is alert and oriented x 3, not in acute distress.  Psych:  Mood and affect appropriate  HEENT: EOMs intact. PERRL. Oropharynx is clear.   Neck: No JVD. No palpable lymphadenopathy. Neck is supple.  Lymphatics: There is no palpable peripheral lymphadenopathy   Chest: Symmetric expansion, nonlabored breathing  Cardiovascular: Regular with palpable distal pulses   Abdomen: Soft, non tender.   Extremities: No edema.  Neurological: 5/5 motor x4.        Laboratory:  WBC: 12.7, done on 1/22/2024.  HGB: 18, done on 1/22/2024.  PLT: 490, done on 1/22/2024.           Lab Results   Component Value Date    GLU 97 11/18/2023    BUN 11 11/18/2023    BUNCREA 12.4 11/18/2023    CREATSERUM 0.89 11/18/2023    ANIONGAP 2 11/18/2023    GFRNAA 89 05/06/2022    GFRAA 103 05/06/2022    CA 9.2 11/18/2023    OSMOCALC 279 11/18/2023    ALKPHO 79 03/01/2023    AST 24 03/01/2023    ALT 25 03/01/2023    BILT 0.4 03/01/2023    TP 7.1 03/01/2023    ALB 3.7 11/18/2023    GLOBULIN 4.3 03/01/2023     (L) 11/18/2023    K 5.0 11/18/2023     11/18/2023    CO2 28.0 11/18/2023       Radiology:  CT chest reviewed stable pulmonary nodule   Cancer Staging   No matching staging information was  found for the patient.      Impression and Plan:  63 year old   male with a history of smoking been eval by hematology for erythrocytosis thrombocytosis pulmonary nodule  - CBC results continue to fluctuate previous erythrocytosis resolves at times.   More likely this is secondary.   Myeloproliferative testing including JAK2 V6 17 F exon 12 through 14 negative from 2021    Hgb wbc remain elevated will get mpl and calr rule out mpd.   More likely from increase in smoking recently     -Pulmonary nodule stable can transition to low-dose lung cancer screening studies annually next Jan 2025      Data:   [facility] ct gen surg notes    Adilson Davis MD

## 2024-03-03 NOTE — H&P
Aly Mott is a 63 year old male.    HPI:     Chief Complaint   Patient presents with    Physical     Pt here for medicare annual exam        Mr. MOTT is a 63 year old male PMHX as below coming in for annual physical examination    Overall doing well. Did complete colonoscopy. He is trying to continue with targeting weight loss over time.    7-8 hours of sleep at night. 1-2 times nocturia. He does drink a lot of water.    PMHx  Nephrotic syndrome  Stage I chronic kidney disease  Notable microalbuminuria.  Started on Farxiga.  Follows with nephrology, Dr. Whatley.  Deferred renal biopsy per patient preference as proteinuria has stabilized.    History of tobacco use  Lung nodule  Has successfully stopped smoking x 10 days.  Notable COPD changes on CT scan, but asymptomatic.  History of lung nodule, with last CT scan in January. Follows with Dr. Davis.     Hypertension  On amlodipine 10 mg daily and lisinopril 20 mg daily    Hyperlipidemia  On atorvastatin 10 mg nightly    Diverticulosis  History of recurrent diverticulitis status post colectomy  - No recurrences, last colonoscopy 10/6/2023 with Dr. Thomas    Erythrocytosis  JAK2 mutation negative.  Likely secondary to tobacco use. Follows with Dr. Davis.     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 [place], zuccini, rice pilaf, turkey tenderloins. Grilled breast. 3-4 eggs a week. He tries to limits the pizza/burger/hot dog x 1 month. Tries to maintain lean meats.   - Physical Activity: golf x 3 days a week; walking a lot; exercycle during the winter, weights to improve [employer] game.  Does go to the [employer]      HISTORY:  Past Medical History:   Diagnosis Date    Diverticulosis     Essential hypertension     Hemorrhoid     High blood pressure     High cholesterol     History of blood in urine     Shows up in U/A    PONV (postoperative nausea and vomiting)     family hx    Renal disorder     recent protein in urine 22 - monitoring and following up after surgery    Visual impairment     glasses      Past Surgical History:   Procedure Laterality Date    CHEST X-RAY 1 VW      21 at [place]     COLECTOMY  2022    LAPAROSCOPIC SIGMOID COLECTOMY POSSIBLE OPEN    COLONOSCOPY  2021    Hx of Polyps (Benign); No family Hx of Colon CA. 1st COLON at 51yo at 61yo    COLONOSCOPY      EKG TRACING FOR INITIAL PREV  2021 with [place]    STRESS TEST      21 [place]       Family History   Problem Relation Age of Onset    Heart Disorder Father     Hypertension Father     Other (CAD) Father     Hypertension Mother     Dementia Mother     Heart Attack Mother     Other (DEAFNESS) Mother     Diabetes Maternal Grandfather     Stroke Maternal Grandfather       Social History:   Social History     Socioeconomic History    Marital status: Life Partner   Tobacco Use    Smoking status: Some Days     Years: 37     Types: Cigarettes, Cigars     Last attempt to quit: 2022     Years since quittin.0    Smokeless tobacco: Never    Tobacco comments:     Smoked 1 - 1.5 pack daily in past. 1.5 packs weekly..   Vaping Use    Vaping Use: Never used   Substance and Sexual Activity    Alcohol use: Yes     Alcohol/week: 0.0 - 3.0 standard drinks of alcohol     Comment: per week    Drug use: Yes     Frequency: 7.0 times per week     Types: Cannabis     Comment: 1/2 cigarrete per day    Sexual activity: Yes     Partners: Female        Medications (Active prior to today's visit):  Current Outpatient Medications   Medication Sig Dispense Refill    LISINOPRIL 20 MG Oral Tab TAKE 1 TABLET(20 MG) BY  MOUTH DAILY 90 tablet 3    AMLODIPINE 10 MG Oral Tab TAKE 1 TABLET(10 MG) BY MOUTH DAILY 90 tablet 3    atorvastatin 10 MG Oral Tab Take 1 tablet (10 mg total) by mouth nightly. 90 tablet 3    FARXIGA 10 MG Oral Tab Take 1 tablet (10 mg total) by mouth daily.         Allergies:  Allergies   Allergen Reactions    Aspirin SWELLING    Sulfa Antibiotics OTHER (SEE COMMENTS)     GI UPSET     Hydrochlorothiazide OTHER (SEE COMMENTS)     Indigestion after dinner, went away after 2 large glasses of drinking water          ROS:   Positive Findings indicated in BOLD    Constitutional: Fever, Chills, Weight Gain, Weight Loss, Night Sweats, Fatigue, Malaise  ENT/Mouth:  Hearing Changes, Ear Pain, Nasal Congestion, Sinus Pain, Hoarseness, Sore throat, Rhinorrhea, Swallowing Difficulty  Eyes: Eye Pain, Swelling, Redness, Foreign Body, Discharge, Vision Changes  Cardiovascular: Chest Pain, SOB, PND, Dyspnea on Exertion, Orthopnea, Claudication, Edema, Palpitations  Respiratory: Cough, Sputum, Wheezing, Shortness of breath  Gastrointestinal: Nausea, Vomiting, Diarrhea, Constipation, Pain, Heartburn, Dysphagia, Bloody stools, Tarry stools  Genitourinary: Dysmenorrhea, Dysuria, Urinary Frequency, Hematuria, Urinary Incontinence, Urgency,  Flank Pain  Musculoskeletal: Arthralgias, Myalgias, Joint Swelling, Joint Stiffness, Back Pain, Neck Pain  Integumentary: Skin Lesions, Pruritis, Hair Changes, Jaundice, Nail changes  Neuro: Weakness, Numbness, Paresthesias, Loss of Consciousness, Syncope, Dizziness, Headache, Falls  Psych: Anxiety, Depression, Insomnia, Suicidal Ideation, Homicidal ideation, Memory Changes  Heme/Lymph: Bruising, Bleeding, Lymphadenopathy  Endocrine: Polyuria, Polydipsia, Temperature Intolerance    PHYSICAL EXAM:   Vital Signs:  Blood pressure 116/80, pulse 85, temperature 98.9 °F (37.2 °C), height 5' 11\" (1.803 m), weight 232 lb 9.6 oz (105.5 kg), SpO2 96%.     Constitutional: No acute distress. Alert and oriented  x 3.  Eyes: EOMI, PERRLA, clear sclera b/l  HENT: NCAT, Moist mucous membranes, Oropharynx without erythema or exudates  Neck: No JVD, no thyromegaly  Cardiovascular: S1, S2, no S3, no S4, Regular rate and rhythm, No murmurs/gallops/rubs.   Vascular: Equal pulses 2+ carotids no bruits or thrills/radial/DP/PT bilaterally  Respiratory: Clear to auscultation bilaterally.  No wheezes/rales/rhonchi  Gastrointestinal: Soft, nontender, nondistended. Positive bowel sounds x 4. No rebound tenderness. No hepatomegaly, No splenomegaly  Genitourinary: No CVA tenderness bilaterally  Neurologic: No focal neurological deficits, CN II-XII intact, light touch intact, MSK Strength 5/5 and symmetric in all extremities, normal gait, 2+ patellar tendon  Musculoskeletal: Full range of motion of all extremities, no clubbing/swelling/edema  Skin: No lesions, No erythema, no jaundice, Cap Refill < 2s  Psychiatric: Appropriate mood and affect  Heme/Lymph/Immune: No cervical LAD      DATA REVIEWED   Labs:  Recent Results (from the past 8760 hour(s))   CBC W/ DIFFERENTIAL    Collection Time: 01/22/24 12:43 PM   Result Value Ref Range    WBC 12.7 (H) 4.0 - 11.0 x10(3) uL    RBC 6.19 (H) 3.80 - 5.80 x10(6)uL    HGB 18.0 (H) 13.0 - 17.5 g/dL    HCT 52.1 39.0 - 53.0 %    MCV 84.2 80.0 - 100.0 fL    MCH 29.1 26.0 - 34.0 pg    MCHC 34.5 31.0 - 37.0 g/dL    RDW-SD 42.9 35.1 - 46.3 fL    RDW 14.0 11.0 - 15.0 %    .0 (H) 150.0 - 450.0 10(3)uL    Neutrophil Absolute Prelim 7.86 (H) 1.50 - 7.70 x10 (3) uL    Neutrophil Absolute 7.86 (H) 1.50 - 7.70 x10(3) uL    Lymphocyte Absolute 3.36 1.00 - 4.00 x10(3) uL    Monocyte Absolute 0.97 0.10 - 1.00 x10(3) uL    Eosinophil Absolute 0.32 0.00 - 0.70 x10(3) uL    Basophil Absolute 0.09 0.00 - 0.20 x10(3) uL    Immature Granulocyte Absolute 0.08 0.00 - 1.00 x10(3) uL    Neutrophil % 62.1 %    Lymphocyte % 26.5 %    Monocyte % 7.6 %    Eosinophil % 2.5 %    Basophil % 0.7 %    Immature Granulocyte % 0.6 %      *Note: Due to a large number of results and/or encounters for the requested time period, some results have not been displayed. A complete set of results can be found in Results Review.       No results found for this or any previous visit (from the past 8760 hour(s)).    Cholesterol, Total (mg/dL)   Date Value   03/01/2023 142     HDL Cholesterol (mg/dL)   Date Value   03/01/2023 45     LDL Cholesterol (mg/dL)   Date Value   03/01/2023 66     Triglycerides (mg/dL)   Date Value   03/01/2023 189 (H)       Last A1c value was 5.7% done 3/1/2023.      Imaging:  CT chest 1/22/2024        Impression   CONCLUSION: Smooth well-circumscribed benign morphology pulmonary nodule in the medial segment of the middle lobe measures 13 x 9 millimeter .  This measured 9 x 7 millimeter on the 1st available chest CT of August 2021; the growth rate is also  suggestive of benign/indolent process            ASSESSMENT/PLAN:     Nephrotic syndrome  Stage I chronic kidney disease. Notable microalbuminuria.   -  Started on Farxiga.  Plan to repeat urine studies with nephrology  - Follows with nephrology, Dr. Whatley.  Deferred renal biopsy per patient preference as proteinuria has stabilized.    History of tobacco use  Lung nodule  Has successfully stopped smoking.  Notable COPD changes on CT scan, but asymptomatic.  - History of lung nodule, with last CT scan in January.  Small well-circumscribed benign morphology pulmonary nodule 13 x 9 mm.  Suggestive of benign, indolent process.  - Will plan to consider further annual screenings.    Hypertension  -Blood pressure today at goal  - Check blood pressures at home  - Continue with home amlodipine 10 mg daily and lisinopril 20 mg daily    Hyperlipidemia  -Last lipid panel 1 year ago with overall good control  - Repeat fasting lipid panel  - Continue with atorvastatin 10 mg nightly    Pulmonary nodule  - CT Chest is stable, being monitored  by Dr. Davis.     Diverticulosis  History of recurrent  diverticulitis status post colectomy  - No recurrences, last colonoscopy 10/6/2023 with Dr. Thomas    Erythrocytosis  JAK2 mutation negative.  Likely secondary to prior tobacco use  - Repeat CBC         Orders This Visit:  No orders of the defined types were placed in this encounter.      Meds This Visit:  Requested Prescriptions     Pending Prescriptions Disp Refills    atorvastatin 10 MG Oral Tab 90 tablet 3     Sig: Take 1 tablet (10 mg total) by mouth nightly.       Imaging & Referrals:  None       Health Maintenance  HTN Screen: BP at goal  DM Screen: Check A1c/fasting sugar  HLD Screen: Check fasting lipid panel  HCV Screen: Considered low risk  HIV Screen: considered low risk  G/C/Syphilis: Considered low risk    Colon Cancer Screening (45-70): Last colonoscopy 10/6/2023 with Dr. Thomas Due in 10 years 2033  Prostate Cancer Screening: (50-70): Check PSA  Lung Cancer Screening (55-79 with 30 p/year and active < 15 years): Up-to-date  AAA Screening (65-75 Hx of smoking): Due for AAA ultrasound    Influenza: Annually   Td/Tdap: Last Tdap 2020, due 2030  Zoster (50+): Up-to-date  HPV (19-26): Not indicated  Pneumococcal: Up-to-date    Immunization History   Administered Date(s) Administered    >= 3 Yrs, FLUZONE, Pres Free (28936), Influenza Vaccine, Flu Clinic 09/12/2022    Covid-19 Vaccine Pfizer 30 mcg/0.3 ml 03/06/2021, 03/31/2021, 10/18/2021, 09/22/2022    Covid-19 Vaccine Pfizer Bivalent 30mcg/0.3mL 10/31/2022    Covid-19 Vaccine Pfizer Mega-Sucrose 30 mcg/0.3 ml 05/28/2022    FLU VAC High Dose 65 YRS & Older PRSV Free (01786) 10/20/2023    FLUZONE 6 months and older PFS 0.5 ml (71436) 10/18/2021    Flucelvax 0.5ml Vaccine 09/28/2022    Influenza 10/20/2023    Pfizer Covid-19 Vaccine 30mcg/0.3ml 12yrs+ (6752-2853) 10/20/2023    Pneumococcal Conjugate PCV20 03/01/2023    TDAP 02/14/2020    Zoster Vaccine Recombinant Adjuvanted (Shingrix) 03/08/2023, 05/25/2023         Return to clinic in 6-12 months for  follow-up     ***

## 2024-03-03 NOTE — PATIENT INSTRUCTIONS
- You were seen in clinic for regular annual check-up. We have ordered labs for you and we will call you with the results. Please obtain the bloodwork fasting for 12 hours. OK to drink water the day of your blood draw.      We have the lab downstairs on the first floor.  No appointment is necessary.  Lab hours are Monday-Friday 7:30 AM to 4:45 PM.  There may be Saturday hours 7 AM-11:00 AM as well.     Otherwise you can obtain the blood tests on the weekends at the main hospital or local immediate care/urgent care within the [facility].    -Given your family history and calcifications of the coronary arteries, let's proceed with a stress test within the next 3 months. This will check for adequate blood flow throughout the heart    In the meantime, lets keep up with good cardiovascular exercise  Will try to control your cholesterol and blood pressure forward to reduce risk of progression of heart disease.    -Congratulations on stopping smoking as this will reduce your risk for negative effects such as lung cancer, progressive lung disease, accelerated heart disease.  I think you have good control of this and lets sustain complete cessation for now  - The lung nodule seen on your last CT scan is likely benign, we will consider repeating in 1 year as directed by Dr. Davis  -We are up-to-date on your colonoscopy.  Likely can discontinue at this time.  - Continue following with nephrology, Dr. Whatley for surveillance of your nephrotic syndrome.  Likely, this has remained stable  -Please continue checking your blood pressures at home and notify us if they are increasing  -You are up-to-date on all your major vaccines  - Please continue to eat a varied diet including recommended servings of vegetables, fruits, and low fat dairy. Minimize high saturated fats (such as fast foods) and high sugar intake (such as soda)  - We recommend 150 minutes of moderate intensity exercise (brisk walking, swimming) weekly to  maintain your current weight.  Targeted weight loss will require more vigorous exercise or more than 150 minutes/week.    Return to clinic in 1 year for annual physical exam    Aly Mott's SCREENING SCHEDULE   Tests on this list are recommended by your physician but may not be covered, or covered at this frequency, by your insurer.   Please check with your insurance carrier before scheduling to verify coverage.   PREVENTATIVE SERVICES FREQUENCY &  COVERAGE DETAILS LAST COMPLETION DATE   Diabetes Screening    Fasting Blood Sugar / Glucose    One screening every 12 months if never tested or if previously tested but not diagnosed with pre-diabetes   One screening every 6 months if diagnosed with pre-diabetes Lab Results   Component Value Date    GLU 97 11/18/2023        Cardiovascular Disease Screening    Lipid Panel  Cholesterol  Lipoprotein (HDL)  Triglycerides Covered every 5 years for all Medicare beneficiaries without apparent signs or symptoms of cardiovascular disease Lab Results   Component Value Date    CHOLEST 142 03/01/2023    HDL 45 03/01/2023    LDL 66 03/01/2023    TRIG 189 (H) 03/01/2023         Electrocardiogram (EKG)   Covered if needed at Welcome to Medicare, and non-screening if indicated for medical reasons 02/15/2022      Ultrasound Screening for Abdominal Aortic Aneurysm (AAA) Covered once in a lifetime for one of the following risk factors    Men who are 65-75 years old and have ever smoked    Anyone with a family history -     Colorectal Cancer Screening  Covered for ages 50-85; only need ONE of the following:    Colonoscopy   Covered every 10 years    Covered every 2 years if patient is at high risk or previous colonoscopy was abnormal 10/06/2023    Health Maintenance   Topic Date Due    Colorectal Cancer Screening  10/06/2033       Flexible Sigmoidoscopy   Covered every 4 years 02/22/2022    Fecal Occult Blood Test Covered annually -   Prostate Cancer Screening    Prostate-Specific  Antigen (PSA) Annually No results found for: \"PSA\"  Health Maintenance   Topic Date Due    PSA  03/01/2025      Immunizations    Influenza Covered once per flu season  Please get every year 10/20/2023  No recommendations at this time    Pneumococcal Each vaccine ([id number] & [id number]) covered once after 65 Prevnar 13: -    [id number]: -     No recommendations at this time    Hepatitis B One screening covered for patients with certain risk factors   -  No recommendations at this time    Tetanus Toxoid Not covered by Medicare Part B unless medically necessary (cut with metal); may be covered with your pharmacy prescription benefits -    Tetanus, Diptheria and Pertusis TD and TDaP Not covered by Medicare Part B -  No recommendations at this time    Zoster Not covered by Medicare Part B; may be covered with your pharmacy  prescription benefits -  No recommendations at this time     Annual Monitoring of Persistent Medications (ACE/ARB, digoxin diuretics, anticonvulsants)    Potassium Annually Lab Results   Component Value Date    K 5.0 11/18/2023         Creatinine   Annually Lab Results   Component Value Date    CREATSERUM 0.89 11/18/2023         BUN Annually Lab Results   Component Value Date    BUN 11 11/18/2023       Drug Serum Conc Annually No results found for: \"DIGOXIN\", \"DIG\", \"VALP\"

## 2024-03-04 ENCOUNTER — OFFICE VISIT (OUTPATIENT)
Dept: INTERNAL MEDICINE CLINIC | Facility: CLINIC | Age: 64
End: 2024-03-04

## 2024-03-04 ENCOUNTER — LAB ENCOUNTER (OUTPATIENT)
Dept: LAB | Age: 64
End: 2024-03-04
Attending: INTERNAL MEDICINE
Payer: MEDICARE

## 2024-03-04 VITALS
HEART RATE: 85 BPM | HEIGHT: 71 IN | BODY MASS INDEX: 32.57 KG/M2 | WEIGHT: 232.63 LBS | OXYGEN SATURATION: 96 % | SYSTOLIC BLOOD PRESSURE: 116 MMHG | DIASTOLIC BLOOD PRESSURE: 80 MMHG | TEMPERATURE: 99 F

## 2024-03-04 DIAGNOSIS — Z72.0 TOBACCO ABUSE: ICD-10-CM

## 2024-03-04 DIAGNOSIS — Z00.00 ANNUAL PHYSICAL EXAM: Primary | ICD-10-CM

## 2024-03-04 DIAGNOSIS — I25.10 CORONARY ARTERY CALCIFICATION: ICD-10-CM

## 2024-03-04 DIAGNOSIS — I25.119 CORONARY ARTERY DISEASE INVOLVING NATIVE CORONARY ARTERY OF NATIVE HEART WITH ANGINA PECTORIS (HCC): ICD-10-CM

## 2024-03-04 DIAGNOSIS — N04.9 NEPHROTIC SYNDROME: ICD-10-CM

## 2024-03-04 DIAGNOSIS — K57.90 DIVERTICULOSIS: ICD-10-CM

## 2024-03-04 DIAGNOSIS — I25.84 CORONARY ARTERY CALCIFICATION: ICD-10-CM

## 2024-03-04 DIAGNOSIS — Z13.220 SCREENING FOR LIPOID DISORDERS: ICD-10-CM

## 2024-03-04 DIAGNOSIS — Z13.29 SCREENING FOR THYROID DISORDER: ICD-10-CM

## 2024-03-04 DIAGNOSIS — D75.1 ERYTHROCYTOSIS: ICD-10-CM

## 2024-03-04 DIAGNOSIS — Z12.5 SCREENING FOR PROSTATE CANCER: ICD-10-CM

## 2024-03-04 DIAGNOSIS — Z00.00 ANNUAL PHYSICAL EXAM: ICD-10-CM

## 2024-03-04 DIAGNOSIS — Z82.49 FAMILY HISTORY OF HEART DISEASE: ICD-10-CM

## 2024-03-04 DIAGNOSIS — Z00.00 ENCOUNTER FOR ANNUAL HEALTH EXAMINATION: ICD-10-CM

## 2024-03-04 DIAGNOSIS — Z13.1 SCREENING FOR DIABETES MELLITUS: ICD-10-CM

## 2024-03-04 DIAGNOSIS — R91.1 LUNG NODULE: ICD-10-CM

## 2024-03-04 DIAGNOSIS — I10 ESSENTIAL HYPERTENSION: ICD-10-CM

## 2024-03-04 DIAGNOSIS — Z13.0 SCREENING FOR DEFICIENCY ANEMIA: ICD-10-CM

## 2024-03-04 LAB
ALBUMIN SERPL-MCNC: 3.9 G/DL (ref 3.2–4.8)
ALBUMIN/GLOB SERPL: 1.4 {RATIO} (ref 1–2)
ALP LIVER SERPL-CCNC: 79 U/L
ALT SERPL-CCNC: 27 U/L
ANION GAP SERPL CALC-SCNC: 7 MMOL/L (ref 0–18)
AST SERPL-CCNC: 27 U/L (ref ?–34)
BASOPHILS # BLD AUTO: 0.08 X10(3) UL (ref 0–0.2)
BASOPHILS NFR BLD AUTO: 0.6 %
BILIRUB SERPL-MCNC: 0.4 MG/DL (ref 0.2–1.1)
BUN BLD-MCNC: 14 MG/DL (ref 9–23)
BUN/CREAT SERPL: 14.4 (ref 10–20)
CALCIUM BLD-MCNC: 9.6 MG/DL (ref 8.7–10.4)
CHLORIDE SERPL-SCNC: 107 MMOL/L (ref 98–112)
CHOLEST SERPL-MCNC: 173 MG/DL (ref ?–200)
CO2 SERPL-SCNC: 22 MMOL/L (ref 21–32)
COMPLEXED PSA SERPL-MCNC: 0.67 NG/ML (ref ?–4)
CREAT BLD-MCNC: 0.97 MG/DL
DEPRECATED RDW RBC AUTO: 42.6 FL (ref 35.1–46.3)
EGFRCR SERPLBLD CKD-EPI 2021: 87 ML/MIN/1.73M2 (ref 60–?)
EOSINOPHIL # BLD AUTO: 0.24 X10(3) UL (ref 0–0.7)
EOSINOPHIL NFR BLD AUTO: 1.9 %
ERYTHROCYTE [DISTWIDTH] IN BLOOD BY AUTOMATED COUNT: 14.1 % (ref 11–15)
FASTING PATIENT LIPID ANSWER: NO
FASTING STATUS PATIENT QL REPORTED: NO
GLOBULIN PLAS-MCNC: 2.7 G/DL (ref 2.8–4.4)
GLUCOSE BLD-MCNC: 87 MG/DL (ref 70–99)
HCT VFR BLD AUTO: 54.6 %
HDLC SERPL-MCNC: 40 MG/DL (ref 40–59)
HGB BLD-MCNC: 18.8 G/DL
IMM GRANULOCYTES # BLD AUTO: 0.07 X10(3) UL (ref 0–1)
IMM GRANULOCYTES NFR BLD: 0.6 %
LDLC SERPL CALC-MCNC: 106 MG/DL (ref ?–100)
LYMPHOCYTES # BLD AUTO: 3.56 X10(3) UL (ref 1–4)
LYMPHOCYTES NFR BLD AUTO: 28.6 %
MCH RBC QN AUTO: 29.1 PG (ref 26–34)
MCHC RBC AUTO-ENTMCNC: 34.4 G/DL (ref 31–37)
MCV RBC AUTO: 84.7 FL
MONOCYTES # BLD AUTO: 1.1 X10(3) UL (ref 0.1–1)
MONOCYTES NFR BLD AUTO: 8.8 %
NEUTROPHILS # BLD AUTO: 7.4 X10 (3) UL (ref 1.5–7.7)
NEUTROPHILS # BLD AUTO: 7.4 X10(3) UL (ref 1.5–7.7)
NEUTROPHILS NFR BLD AUTO: 59.5 %
NONHDLC SERPL-MCNC: 133 MG/DL (ref ?–130)
OSMOLALITY SERPL CALC.SUM OF ELEC: 282 MOSM/KG (ref 275–295)
PLATELET # BLD AUTO: 380 10(3)UL (ref 150–450)
POTASSIUM SERPL-SCNC: 4.6 MMOL/L (ref 3.5–5.1)
PROT SERPL-MCNC: 6.6 G/DL (ref 5.7–8.2)
RBC # BLD AUTO: 6.45 X10(6)UL
SODIUM SERPL-SCNC: 136 MMOL/L (ref 136–145)
TRIGL SERPL-MCNC: 151 MG/DL (ref 30–149)
TSI SER-ACNC: 2.35 MIU/ML (ref 0.55–4.78)
VLDLC SERPL CALC-MCNC: 26 MG/DL (ref 0–30)
WBC # BLD AUTO: 12.5 X10(3) UL (ref 4–11)

## 2024-03-04 PROCEDURE — 36415 COLL VENOUS BLD VENIPUNCTURE: CPT

## 2024-03-04 PROCEDURE — 85025 COMPLETE CBC W/AUTO DIFF WBC: CPT

## 2024-03-04 PROCEDURE — 84443 ASSAY THYROID STIM HORMONE: CPT

## 2024-03-04 PROCEDURE — 80053 COMPREHEN METABOLIC PANEL: CPT

## 2024-03-04 PROCEDURE — 80061 LIPID PANEL: CPT

## 2024-03-04 RX ORDER — ATORVASTATIN CALCIUM 10 MG/1
10 TABLET, FILM COATED ORAL NIGHTLY
Qty: 90 TABLET | Refills: 3 | Status: SHIPPED | OUTPATIENT
Start: 2024-03-04

## 2024-03-04 NOTE — H&P
HPI:   Aly Mott is a 63 year old male who presents for a Medicare Initial Preventative Physical Exam (Welcome to Medicare- < 12 months on Medicare).    Mr. MOTT is a 63 year old male PMHX as below coming in for annual physical examination     Overall doing well. Did complete colonoscopy. He is trying to continue with targeting weight loss over time.     7-8 hours of sleep at night. 1-2 times nocturia. He does drink a lot of water.     PMHx  Nephrotic syndrome  Stage I chronic kidney disease  Notable microalbuminuria.  Started on Farxiga.  Follows with nephrology, Dr. Whatley.  Deferred renal biopsy per patient preference as proteinuria has stabilized.     History of tobacco use  Lung nodule  Has successfully stopped smoking x 10 days.  Notable COPD changes on CT scan, but asymptomatic.  History of lung nodule, with last CT scan in January. Follows with Dr. Davis.      Hypertension  On amlodipine 10 mg daily and lisinopril 20 mg daily     Hyperlipidemia  On atorvastatin 10 mg nightly     Diverticulosis  History of recurrent diverticulitis status post colectomy  - No recurrences, last colonoscopy 10/6/2023 with Dr. Thomas     Erythrocytosis  JAK2 mutation negative.  Likely secondary to tobacco use. Follows with Dr. Davis.      I reviewed and updated the PMHx, FamHx, medications, allergies, and SocHx as below with the patient     SocHX  - Home: feels safe at home - watches his 8 mo; with partner at home  - Work: feels safe at work - 50 years in [employer] business. Worked 70 hrs per week,  - Sexual Activity: with partner  - Hobbies: cooking, golf, music, tries to avoid just watching TV  - Nutrition: oatmeal, banana, some fried foods at times. Dougherty, zuccini, rice pilaf, turkey tenderloins. Grilled breast. 3-4 eggs a week. He tries to limits the pizza/burger/hot dog x 1 month. Tries to maintain lean meats.   - Physical Activity: golf x 3 days a week; walking a lot; exercycle during the winter, weights to  improve ticckle game. Does go to the [employer] range    No topic due editable text        Fall Risk Assessment:   He has been screened for Falls and is low risk.    Cognitive Assessment:   He had a completely normal cognitive assessment - see flowsheet entries   Functional Ability/Status:   Aly Mott has a completely normal functional assessment. See flowsheet for details.      Depression Screening (PHQ-2/PHQ-9): Over the LAST 2 WEEKS   Little interest or pleasure in doing things (over the last two weeks)?: Not at all  Feeling down, depressed, or hopeless (over the last two weeks)?: Not at all  PHQ-2 SCORE: 0  Little interest or pleasure in doing things: Not at all  Feeling down, depressed, or hopeless: Not at all  PHQ-2 SCORE: 0      Advanced Directive:  He does NOT have a Living Will. [Do you have a living will?: Yes]  He does NOT have a Power of  for Health Care. [Do you have a healthcare power of ?: No]    Tobacco:  He currently smokes tobacco.  Social History    Tobacco Use      Smoking status: Some Days        Years: 37        Types: Cigarettes, Cigars        Last attempt to quit: 2022        Years since quittin.0      Smokeless tobacco: Never      Tobacco comments: Smoked 1 - 1.5 pack daily in past. 1.5 packs weekly..     Tobacco Cessation Documentation (Smoking and Smokeless included):  I had an in depth therapy session with Aly Mott about his tobacco use risks and options using the USPSTF's Five A's approach:    Ask: Aly is using tobacco products.  Assess: We asked about/assessed behavioral health risk and factors affecting choice of behavior change goals/methods.  Specifically I asked about readiness to quit tobacco.  Advise: We gave clear, specific, and personalized behavior change advice, including information about personal health harms and benefits. Specifically, he was told that Quitting tobacco is one of the best things he can do for his health. I  strongly encouraged him to quit.      Agree: We collaboratively selected appropriate treatment goals and methods based on the patient’s interest in and willingness to change the behavior.   Assist: We used behavior change techniques (self-help and/or counseling), to aid Aly in achieving agreed-upon goals by acquiring the skills, confidence, and social/environmental supports for behavior change, supplemented with adjunctive medical treatments when appropriate. Additionally, national quitting tobacco aides were discussed.   Arrange: Follow up at next visit- see specific follow up below.    Time Counseled: <1 minutes       CAGE Alcohol Screen:   CAGE screening score of 0 on 3/4/2024, showing low risk of alcohol abuse.       Patient Care Team: Patient Care Team:  Eric Powell MD as PCP - General (Internal Medicine)    Patient Active Problem List   Diagnosis    Essential hypertension    Diverticulosis    Tobacco abuse    Erythrocytosis    Colonic diverticular abscess    Azotemia    Diverticulitis    History of diverticulitis    Nephrotic syndrome    Lung nodule     Wt Readings from Last 3 Encounters:   03/04/24 232 lb 9.6 oz (105.5 kg)   01/29/24 234 lb (106.1 kg)   10/02/23 235 lb (106.6 kg)      Last Cholesterol Labs:   Lab Results   Component Value Date    CHOLEST 142 03/01/2023    HDL 45 03/01/2023    LDL 66 03/01/2023    TRIG 189 (H) 03/01/2023          Last Chemistry Labs:   Lab Results   Component Value Date    AST 24 03/01/2023    ALT 25 03/01/2023    CA 9.2 11/18/2023    ALB 3.7 11/18/2023    TSH 1.680 03/01/2023    CREATSERUM 0.89 11/18/2023    GLU 97 11/18/2023        CBC  (most recent labs)   Lab Results   Component Value Date    WBC 12.7 (H) 01/22/2024    HGB 18.0 (H) 01/22/2024    .0 (H) 01/22/2024        ALLERGIES:   He is allergic to aspirin, sulfa antibiotics, and hydrochlorothiazide.    CURRENT MEDICATIONS:   Outpatient Medications Marked as Taking for the 3/4/24 encounter (Office Visit)  with Eric Powell MD   Medication Sig    atorvastatin 10 MG Oral Tab Take 1 tablet (10 mg total) by mouth nightly.    LISINOPRIL 20 MG Oral Tab TAKE 1 TABLET(20 MG) BY MOUTH DAILY    AMLODIPINE 10 MG Oral Tab TAKE 1 TABLET(10 MG) BY MOUTH DAILY    FARXIGA 10 MG Oral Tab Take 1 tablet (10 mg total) by mouth daily.      MEDICAL INFORMATION:   He  has a past medical history of Diverticulosis, Essential hypertension, Hemorrhoid, High blood pressure, High cholesterol, History of blood in urine, PONV (postoperative nausea and vomiting), Renal disorder, and Visual impairment.    He  has a past surgical history that includes stress test; ekg tracing for initial prev (01/19/2021); chest x-ray 1 vw; colonoscopy (04/14/2021); Colectomy (02/23/2022); and colonoscopy.    His family history includes CAD in his father; DEAFNESS in his mother; Dementia in his mother; Diabetes in his maternal grandfather; Heart Attack in his mother; Heart Disorder in his father; Hypertension in his father and mother; Stroke in his maternal grandfather.   SOCIAL HISTORY:   He  reports that he has been smoking cigarettes and cigars. He has never used smokeless tobacco. He reports current alcohol use. He reports current drug use. Frequency: 7.00 times per week. Drug: Cannabis.     REVIEW OF SYSTEMS:   GENERAL: feels well otherwise  SKIN: denies any unusual skin lesions  EYES: denies blurred vision or double vision  HEENT: denies nasal congestion, sinus pain or ST  LUNGS: denies shortness of breath with exertion  CARDIOVASCULAR: denies chest pain on exertion  GI: denies abdominal pain, denies heartburn  : 0 per night nocturia, no complaint of urinary incontinence  MUSCULOSKELETAL: denies back pain  NEURO: denies headaches  PSYCHE: denies depression or anxiety  HEMATOLOGIC: denies hx of anemia  ENDOCRINE: denies thyroid history  ALL/ASTHMA: denies hx of allergy or asthma    EXAM:   /80   Pulse 85   Temp 98.9 °F (37.2 °C)   Ht 5' 11\" (1.803  m)   Wt 232 lb 9.6 oz (105.5 kg)   SpO2 96%   BMI 32.44 kg/m²   Estimated body mass index is 32.44 kg/m² as calculated from the following:    Height as of this encounter: 5' 11\" (1.803 m).    Weight as of this encounter: 232 lb 9.6 oz (105.5 kg).    Medicare Hearing Assessment  (Required for AWV/SWV)    Hearing Screening    Screening Method: Whisper Test  Whisper Test Result: Pass                      Visual Acuity  Right Eye Visual Acuity: Corrected Right Eye Chart Acuity: 20/20   Left Eye Visual Acuity: Corrected Left Eye Chart Acuity: 20/20   Both Eyes Visual Acuity: Corrected Both Eyes Chart Acuity: 20/20   Able To Tolerate Visual Acuity: Yes      General Appearance:  Alert, cooperative, no distress, appears stated age   Head:  Normocephalic, without obvious abnormality, atraumatic   Eyes:  PERRL, conjunctiva/corneas clear, EOM's intact, both eyes   Ears:  Normal TM's and external ear canals, both ears   Nose: Nares normal, septum midline, mucosa normal, no drainage or sinus tenderness   Throat: Lips, mucosa, and tongue normal; teeth and gums normal   Neck: Supple, symmetrical, trachea midline, no adenopathy, thyroid: not enlarged, symmetric, no tenderness/mass/nodules, no carotid bruit or JVD   Back:   Symmetric, no curvature, ROM normal, no CVA tenderness   Lungs:   Clear to auscultation bilaterally, respirations unlabored   Chest Wall:  No tenderness or deformity   Heart:  Regular rate and rhythm, S1, S2 normal, no murmur, rub or gallop   Abdomen:   Soft, non-tender, bowel sounds active all four quadrants,  no masses, no organomegaly   Genitalia: Deferred   Rectal: Deferred   Extremities: Extremities normal, atraumatic, no cyanosis or edema   Pulses: 2+ and symmetric   Skin: Skin color, texture, turgor normal, no rashes or lesions   Lymph nodes: Cervical, supraclavicular, and axillary nodes normal   Neurologic: Normal, CN II through XII intact, 5 out of 5 muscle strength throughout, 2+ DTRs patellar  tendons, normal gait          Vaccination History     Immunization History   Administered Date(s) Administered    >= 3 Yrs, FLUZONE, Pres Free (53649), Influenza Vaccine, [facility] 09/12/2022    Covid-19 Vaccine Pfizer 30 mcg/0.3 ml 03/06/2021, 03/31/2021, 10/18/2021, 09/22/2022    Covid-19 Vaccine Pfizer Bivalent 30mcg/0.3mL 10/31/2022    Covid-19 Vaccine Pfizer Mega-Sucrose 30 mcg/0.3 ml 05/28/2022    FLU VAC High Dose 65 YRS & Older PRSV Free (88114) 10/20/2023    FLUZONE 6 months and older PFS 0.5 ml (48268) 10/18/2021    Flucelvax 0.5ml Vaccine 09/28/2022    Influenza 10/20/2023    Pfizer Covid-19 Vaccine 30mcg/0.3ml 12yrs+ ([id number]) 10/20/2023    Pneumococcal Conjugate PCV20 03/01/2023    TDAP 02/14/2020    Zoster Vaccine Recombinant Adjuvanted (Shingrix) 03/08/2023, 05/25/2023        ASSESSMENT AND OTHER RELEVANT CHRONIC CONDITIONS:   Aly Mott is a 63 year old male who presents for a Medicare Assessment.     Imaging:  CT chest 1/22/2024        Impression   CONCLUSION: Smooth well-circumscribed benign morphology pulmonary nodule in the medial segment of the middle lobe measures 13 x 9 millimeter .  This measured 9 x 7 millimeter on the 1st available chest CT of August 2021; the growth rate is also  suggestive of benign/indolent process       PLAN SUMMARY:   Diagnoses and all orders for this visit:    Annual physical exam  -     Comp Metabolic Panel (14); Future    Nephrotic syndrome  -     Comp Metabolic Panel (14); Future    Diverticulosis    Lung nodule    Essential hypertension    Erythrocytosis  -     CBC With Differential With Platelet; Future    Tobacco abuse    Screening for diabetes mellitus  -     Comp Metabolic Panel (14); Future    Screening for thyroid disorder  -     TSH W Reflex To Free T4; Future    Screening for deficiency anemia    Screening for prostate cancer  -     PSA Total, Screen; Future    Screening for lipoid disorders  -     Lipid Panel; Future    Encounter for annual  health examination    Coronary artery disease involving native coronary artery of native heart with angina pectoris (HCC)    Other orders  -     atorvastatin 10 MG Oral Tab; Take 1 tablet (10 mg total) by mouth nightly.       Coronary artery disease  Severe atherosclerosis noted on CT scan of the chest as an incidental finding.  He does have family history of heart disease.  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 Notable microalbuminuria.   -  Started on Farxiga.  Plan to repeat urine studies with nephrology  - Follows with nephrology, Dr. Whatley.  Deferred renal biopsy per patient preference as proteinuria has stabilized.     History of tobacco use  Lung nodule  Has successfully stopped smoking.  Notable COPD changes on CT scan, but asymptomatic.  - History of lung nodule, with last CT scan in January.  Small well-circumscribed benign morphology pulmonary nodule 13 x 9 mm.  Suggestive of benign, indolent process.  - Will plan to consider further annual screenings.     Hypertension  -Blood pressure today at goal  - Check blood pressures at home  - Continue with home amlodipine 10 mg daily and lisinopril 20 mg daily     Hyperlipidemia  -Last lipid panel 1 year ago with overall good control  - Repeat fasting lipid panel  - Continue with atorvastatin 10 mg nightly     Pulmonary nodule  - CT Chest is stable, being monitored  by Dr. Davis.      Diverticulosis  History of recurrent diverticulitis status post colectomy  - No recurrences, last colonoscopy 10/6/2023 with Dr. Thomas     Erythrocytosis  JAK2 mutation negative.  Likely secondary to prior tobacco use  - Repeat CBC    HTN Screen: BP at  goal  DM Screen: Check A1c/fasting sugar  HLD Screen: Check fasting lipid panel  HCV Screen: Considered low risk  HIV Screen: considered low risk  G/C/Syphilis: Considered low risk     Colon Cancer Screening (45-70): Last colonoscopy 10/6/2023 with Dr. William Madison in 10 years 2033  Prostate Cancer Screening: (50-70): Check PSA  Lung Cancer Screening (55-79 with 30 p/year and active < 15 years): Up-to-date  AAA Screening (65-75 Hx of smoking): Due for AAA ultrasound     Influenza: Annually   Td/Tdap: Last Tdap 2020, due 2030  Zoster (50+): Up-to-date  HPV (19-26): Not indicated  Pneumococcal: Up-to-date       Immunization History   Administered Date(s) Administered    >= 3 Yrs, FLUZONE, Pres Free (00965), Influenza Vaccine, Flu Clinic 09/12/2022    Covid-19 Vaccine Pfizer 30 mcg/0.3 ml 03/06/2021, 03/31/2021, 10/18/2021, 09/22/2022    Covid-19 Vaccine Pfizer Bivalent 30mcg/0.3mL 10/31/2022    Covid-19 Vaccine Pfizer Mega-Sucrose 30 mcg/0.3 ml 05/28/2022    FLU VAC High Dose 65 YRS & Older PRSV Free (87508) 10/20/2023    FLUZONE 6 months and older PFS 0.5 ml (59205) 10/18/2021    Flucelvax 0.5ml Vaccine 09/28/2022    Influenza 10/20/2023    Pfizer Covid-19 Vaccine 30mcg/0.3ml 12yrs+ ([id number]) 10/20/2023    Pneumococcal Conjugate PCV20 03/01/2023    TDAP 02/14/2020    Zoster Vaccine Recombinant Adjuvanted (Shingrix) 03/08/2023, 05/25/2023         Diet assessment: good     PLAN:  The patient indicates understanding of these issues and agrees to the plan.  Reinforced healthy diet, lifestyle, and exercise.    No follow-ups on file.     Eric Powell MD, 3/4/2024     General Health     In the past six months, have you lost more than 10 pounds without trying?: 2 - No  Has your appetite been poor?: No  How does the patient maintain a good energy level?: Appropriate Exercise  How would you describe your daily physical activity?: Moderate  How would you describe your current health state?: Good  How do you maintain positive  mental well-being?: Social Interaction;Puzzles;Games;Visiting Friends;Visiting Family     Supplementary Documentation:   Aly Mott's SCREENING SCHEDULE   Tests on this list are recommended by your physician but may not be covered, or covered at this frequency, by your insurer.   Please check with your insurance carrier before scheduling to verify coverage.   PREVENTATIVE SERVICES FREQUENCY &  COVERAGE DETAILS LAST COMPLETION DATE   Diabetes Screening    Fasting Blood Sugar / Glucose    One screening every 12 months if never tested or if previously tested but not diagnosed with pre-diabetes   One screening every 6 months if diagnosed with pre-diabetes Lab Results   Component Value Date    GLU 97 11/18/2023        Cardiovascular Disease Screening    Lipid Panel  Cholesterol  Lipoprotein (HDL)  Triglycerides Covered every 5 years for all Medicare beneficiaries without apparent signs or symptoms of cardiovascular disease Lab Results   Component Value Date    CHOLEST 142 03/01/2023    HDL 45 03/01/2023    LDL 66 03/01/2023    TRIG 189 (H) 03/01/2023         Electrocardiogram (EKG)   Covered if needed at Welcome to Medicare, and non-screening if indicated for medical reasons 02/15/2022      Ultrasound Screening for Abdominal Aortic Aneurysm (AAA) Covered once in a lifetime for one of the following risk factors    Men who are 65-75 years old and have ever smoked    Anyone with a family history -     Colorectal Cancer Screening  Covered for ages 50-85; only need ONE of the following:    Colonoscopy   Covered every 10 years    Covered every 2 years if patient is at high risk or previous colonoscopy was abnormal 10/06/2023    Health Maintenance   Topic Date Due    Colorectal Cancer Screening  10/06/2033       Flexible Sigmoidoscopy   Covered every 4 years 02/22/2022    Fecal Occult Blood Test Covered annually -   Prostate Cancer Screening    Prostate-Specific Antigen (PSA) Annually No results found for:  \"PSA\"  Health Maintenance   Topic Date Due    PSA  03/01/2025      Immunizations    Influenza Covered once per flu season  Please get every year 10/20/2023  No recommendations at this time    Pneumococcal Each vaccine ([id number] & [id number]) covered once after 65 Prevnar 13: -    [id number]: -     No recommendations at this time    Hepatitis B One screening covered for patients with certain risk factors   -  No recommendations at this time    Tetanus Toxoid Not covered by Medicare Part B unless medically necessary (cut with metal); may be covered with your pharmacy prescription benefits -    Tetanus, Diptheria and Pertusis TD and TDaP Not covered by Medicare Part B -  No recommendations at this time    Zoster Not covered by Medicare Part B; may be covered with your pharmacy  prescription benefits -  No recommendations at this time      Annual Monitoring of Persistent Medications (ACE/ARB, digoxin diuretics, anticonvulsants)    Potassium Annually Lab Results   Component Value Date    K 5.0 11/18/2023         Creatinine   Annually Lab Results   Component Value Date    CREATSERUM 0.89 11/18/2023         BUN Annually Lab Results   Component Value Date    BUN 11 11/18/2023       Drug Serum Conc Annually No results found for: \"DIGOXIN\", \"DIG\", \"VALP\"

## 2024-03-05 ENCOUNTER — TELEPHONE (OUTPATIENT)
Dept: INTERNAL MEDICINE CLINIC | Facility: CLINIC | Age: 64
End: 2024-03-05

## 2024-03-05 NOTE — TELEPHONE ENCOUNTER
[facility] verbal[facility], has neo with Hematology 4/22

## 2024-03-05 NOTE — TELEPHONE ENCOUNTER
Please notify patient that I reviewed the blood work from yesterday      Cholesterol bumped up slightly from last year, but close to the normal range.  Lets continue with optimizing nutrition, exercising, as he has been doing at home.    Red blood cell count is still about the same 18.8, follow-up with hematology    No other new recommendations for now, he is overall doing well at home.

## 2024-04-04 ENCOUNTER — TELEPHONE (OUTPATIENT)
Dept: NEPHROLOGY | Facility: CLINIC | Age: 64
End: 2024-04-04

## 2024-04-06 ENCOUNTER — LAB ENCOUNTER (OUTPATIENT)
Dept: LAB | Facility: HOSPITAL | Age: 64
End: 2024-04-06
Attending: INTERNAL MEDICINE
Payer: MEDICARE

## 2024-04-06 DIAGNOSIS — N18.9 CHRONIC KIDNEY DISEASE, UNSPECIFIED CKD STAGE: ICD-10-CM

## 2024-04-06 LAB
ALBUMIN SERPL-MCNC: 3.8 G/DL (ref 3.2–4.8)
ANION GAP SERPL CALC-SCNC: 1 MMOL/L (ref 0–18)
BILIRUB UR QL: NEGATIVE
BUN BLD-MCNC: 14 MG/DL (ref 9–23)
BUN/CREAT SERPL: 13.2 (ref 10–20)
CALCIUM BLD-MCNC: 9.5 MG/DL (ref 8.7–10.4)
CHLORIDE SERPL-SCNC: 108 MMOL/L (ref 98–112)
CLARITY UR: CLEAR
CO2 SERPL-SCNC: 29 MMOL/L (ref 21–32)
CREAT BLD-MCNC: 1.06 MG/DL
CREAT UR-SCNC: 70.1 MG/DL
EGFRCR SERPLBLD CKD-EPI 2021: 77 ML/MIN/1.73M2 (ref 60–?)
GLUCOSE BLD-MCNC: 94 MG/DL (ref 70–99)
GLUCOSE UR-MCNC: >1000 MG/DL
KETONES UR-MCNC: NEGATIVE MG/DL
LEUKOCYTE ESTERASE UR QL STRIP.AUTO: NEGATIVE
MICROALBUMIN UR-MCNC: >380 MG/DL
NITRITE UR QL STRIP.AUTO: NEGATIVE
OSMOLALITY SERPL CALC.SUM OF ELEC: 286 MOSM/KG (ref 275–295)
PH UR: 6 [PH] (ref 5–8)
PHOSPHATE SERPL-MCNC: 4.5 MG/DL (ref 2.4–5.1)
POTASSIUM SERPL-SCNC: 5.6 MMOL/L (ref 3.5–5.1)
PROT UR-MCNC: 300 MG/DL
PTH-INTACT SERPL-MCNC: 44.8 PG/ML (ref 18.5–88)
SODIUM SERPL-SCNC: 138 MMOL/L (ref 136–145)
SP GR UR STRIP: 1.02 (ref 1–1.03)
UROBILINOGEN UR STRIP-ACNC: NORMAL

## 2024-04-06 PROCEDURE — 80069 RENAL FUNCTION PANEL: CPT

## 2024-04-06 PROCEDURE — 81001 URINALYSIS AUTO W/SCOPE: CPT

## 2024-04-06 PROCEDURE — 82550 ASSAY OF CK (CPK): CPT | Performed by: INTERNAL MEDICINE

## 2024-04-06 PROCEDURE — 82043 UR ALBUMIN QUANTITATIVE: CPT

## 2024-04-06 PROCEDURE — 36415 COLL VENOUS BLD VENIPUNCTURE: CPT

## 2024-04-06 PROCEDURE — 83970 ASSAY OF PARATHORMONE: CPT

## 2024-04-06 PROCEDURE — 82570 ASSAY OF URINE CREATININE: CPT

## 2024-04-08 ENCOUNTER — TELEPHONE (OUTPATIENT)
Dept: NEPHROLOGY | Facility: CLINIC | Age: 64
End: 2024-04-08

## 2024-04-08 DIAGNOSIS — N18.9 CHRONIC KIDNEY DISEASE, UNSPECIFIED CKD STAGE: Primary | ICD-10-CM

## 2024-04-08 LAB — CK SERPL-CCNC: 144 U/L

## 2024-04-08 NOTE — TELEPHONE ENCOUNTER
tal Martino spoke to pt ( confirmed). States he gets leg cramps from lisinopril and atorvastatin? Has been eating a lot of bananas and taking a supplement with a lot of potassium in it. States he will cut back on all of that. Verbalized understanding.

## 2024-04-08 NOTE — TELEPHONE ENCOUNTER
----- Message from Sarah Whatley MD sent at 4/8/2024  9:00 AM CDT -----  Will you let Bill know I got his labs and everything looks good. I will discuss with him at up coming appt, the only thing is that his potassium is running high.  I would like for him to watch the potassium in his diet - cut it down a bit to 3000 mg.  We can mail him a list

## 2024-04-11 ENCOUNTER — OFFICE VISIT (OUTPATIENT)
Facility: CLINIC | Age: 64
End: 2024-04-11
Payer: COMMERCIAL

## 2024-04-11 VITALS
SYSTOLIC BLOOD PRESSURE: 118 MMHG | WEIGHT: 238 LBS | DIASTOLIC BLOOD PRESSURE: 80 MMHG | HEART RATE: 83 BPM | BODY MASS INDEX: 33 KG/M2

## 2024-04-11 DIAGNOSIS — R80.9 PROTEINURIA, UNSPECIFIED TYPE: Primary | ICD-10-CM

## 2024-04-11 DIAGNOSIS — N18.1 STAGE 1 CHRONIC KIDNEY DISEASE: ICD-10-CM

## 2024-04-11 DIAGNOSIS — I10 HYPERTENSION, UNSPECIFIED TYPE: ICD-10-CM

## 2024-04-11 PROCEDURE — 99214 OFFICE O/P EST MOD 30 MIN: CPT | Performed by: INTERNAL MEDICINE

## 2024-04-11 RX ORDER — DAPAGLIFLOZIN 10 MG/1
10 TABLET, FILM COATED ORAL DAILY
Qty: 90 TABLET | Refills: 2 | Status: SHIPPED | OUTPATIENT
Start: 2024-04-11 | End: 2024-07-10

## 2024-04-11 NOTE — PROGRESS NOTES
Progress Note     Aly Mott    Here for follow up, retired      HISTORY:  Past Medical History:    Diabetic retinopathy (HCC)    Diverticulosis    Essential hypertension    Hemorrhoid    High blood pressure    High cholesterol    History of blood in urine    Shows up in U/A    PONV (postoperative nausea and vomiting)    family hx    Renal disorder    recent protein in urine 2/9/22 - monitoring and following up after surgery    Visual impairment    glasses      Past Surgical History:   Procedure Laterality Date    Chest x-ray 1 vw      1/19/21 at [place]     Colectomy  02/23/2022    LAPAROSCOPIC SIGMOID COLECTOMY POSSIBLE OPEN    Colonoscopy  04/14/2021    Hx of Polyps (Benign); No family Hx of Colon CA. 1st COLON at 51yo at 61yo    Colonoscopy      Ekg tracing for initial prev  01/19/2021 1/19/21 with [facility]    Stress test      1/19/21 [facility]            Medications (Active prior to today's visit):  Current Outpatient Medications   Medication Sig Dispense Refill    dapagliflozin (FARXIGA) 10 MG Oral Tab Take 1 tablet (10 mg total) by mouth daily. 90 tablet 2    atorvastatin 10 MG Oral Tab Take 1 tablet (10 mg total) by mouth nightly. 90 tablet 3    LISINOPRIL 20 MG Oral Tab TAKE 1 TABLET(20 MG) BY MOUTH DAILY 90 tablet 3    AMLODIPINE 10 MG Oral Tab TAKE 1 TABLET(10 MG) BY MOUTH DAILY 90 tablet 3    FARXIGA 10 MG Oral Tab Take 1 tablet (10 mg total) by mouth daily.         Allergies:  Allergies   Allergen Reactions    Aspirin SWELLING    Sulfa Antibiotics OTHER (SEE COMMENTS)     GI UPSET     Hydrochlorothiazide OTHER (SEE COMMENTS)     Indigestion after dinner, went away after 2 large glasses of drinking water          ROS:     Constitutional:  Negative for decreased activity, fever, irritability and lethargy  ENMT:  Negative for ear drainage, hearing loss and nasal drainage  Eyes:  Negative for eye discharge and vision loss  Cardiovascular:  Negative for chest pain, sob  Respiratory:  Negative for  cough, dyspnea and wheezing  Gastrointestinal:  Negative for abdominal pain, constipation  Genitourinary:  Negative for dysuria and hematuria  Endocrine:  Negative for abnormal sleep patterns  Hema/Lymph:  Negative for easy bleeding and easy bruising  Integumentary:  Negative for pruritus and rash  Musculoskeletal:  Negative for bone/joint symptoms  Neurological:  Negative for gait disturbance  Psychiatric:  Negative for inappropriate interaction and psychiatric symptoms      Vitals:    04/11/24 1344   BP: 118/80   Pulse: 83       PHYSICAL EXAM:   Constitutional: appears well hydrated alert and responsive   Head/Face: normocephalic  Eyes/Vision: normal extraocular motion is intact  Nose/Mouth/Throat:mucous membranes are moist   Neck/Thyroid: neck is supple without adenopathy  Lymphatic: no abnormal cervical, supraclavicular adenopathy is noted  Respiratory:  lungs are clear to auscultation bilaterally  Cardiovascular: regular rate and rhythm   Abdomen: soft, non-tender, non-distended, BS normal  Skin/Hair: no unusual rashes present, no abnormal bruising noted  Musculoskeletal: no deformities  Extremities: no edema  Neurological:  Grossly normal       Lab Results   Component Value Date    GLU 94 04/06/2024     04/06/2024    K 5.6 (H) 04/06/2024     04/06/2024    CO2 29.0 04/06/2024    ANIONGAP 1 04/06/2024    BUN 14 04/06/2024    CREATSERUM 1.06 04/06/2024    CA 9.5 04/06/2024    OSMOCALC 286 04/06/2024    EGFRCR 77 04/06/2024    ALB 3.8 04/06/2024    PHOS 4.5 04/06/2024         ASSESSMENT/PLAN:   Assessment   1. Proteinuria, unspecified type  He has nephrotic range proteinuria.  We have started him on Farxiga for renal protection.  His proteinuria improved     We did a glomerular work-up in the past which was negative.  The patient has deferred a kidney biopsy due to this and due to the fact that his nephrotic range proteinuria has stabilized.     Continue Farxiga for now    - Renal Function Panel;  Future  - Urinalysis, Routine; Future  - Microalb/Creat Ratio, Random Urine; Future  - Vitamin D; Future    2. Stage 1 chronic kidney disease  As above    3. Hypertension, unspecified type  Controlled on amlodipine and lisinopril   Counselled him on low K diet             Orders This Visit:  Orders Placed This Encounter   Procedures    Renal Function Panel    Urinalysis, Routine    Microalb/Creat Ratio, Random Urine    Vitamin D       Meds This Visit:  Requested Prescriptions     Signed Prescriptions Disp Refills    dapagliflozin (FARXIGA) 10 MG Oral Tab 90 tablet 2     Sig: Take 1 tablet (10 mg total) by mouth daily.       Imaging & Referrals:  None     4/11/2024   ALMA MCKEON MD    Return in about 6 months (around 10/11/2024).

## 2024-04-24 ENCOUNTER — LAB ENCOUNTER (OUTPATIENT)
Dept: LAB | Facility: REFERENCE LAB | Age: 64
End: 2024-04-24
Attending: INTERNAL MEDICINE
Payer: MEDICARE

## 2024-04-24 DIAGNOSIS — D75.1 ERYTHROCYTOSIS: ICD-10-CM

## 2024-04-24 DIAGNOSIS — D47.1 MYELOPROLIFERATIVE DISORDER (HCC): ICD-10-CM

## 2024-04-24 LAB
BASOPHILS # BLD AUTO: 0.05 X10(3) UL (ref 0–0.2)
BASOPHILS NFR BLD AUTO: 0.4 %
DEPRECATED RDW RBC AUTO: 43.7 FL (ref 35.1–46.3)
EOSINOPHIL # BLD AUTO: 0.2 X10(3) UL (ref 0–0.7)
EOSINOPHIL NFR BLD AUTO: 1.7 %
ERYTHROCYTE [DISTWIDTH] IN BLOOD BY AUTOMATED COUNT: 14.1 % (ref 11–15)
HCT VFR BLD AUTO: 49 %
HGB BLD-MCNC: 16.9 G/DL
IMM GRANULOCYTES # BLD AUTO: 0.07 X10(3) UL (ref 0–1)
IMM GRANULOCYTES NFR BLD: 0.6 %
LYMPHOCYTES # BLD AUTO: 2.82 X10(3) UL (ref 1–4)
LYMPHOCYTES NFR BLD AUTO: 23.3 %
MCH RBC QN AUTO: 29.3 PG (ref 26–34)
MCHC RBC AUTO-ENTMCNC: 34.5 G/DL (ref 31–37)
MCV RBC AUTO: 84.9 FL
MONOCYTES # BLD AUTO: 0.9 X10(3) UL (ref 0.1–1)
MONOCYTES NFR BLD AUTO: 7.4 %
NEUTROPHILS # BLD AUTO: 8.07 X10 (3) UL (ref 1.5–7.7)
NEUTROPHILS # BLD AUTO: 8.07 X10(3) UL (ref 1.5–7.7)
NEUTROPHILS NFR BLD AUTO: 66.6 %
PLATELET # BLD AUTO: 448 10(3)UL (ref 150–450)
RBC # BLD AUTO: 5.77 X10(6)UL
WBC # BLD AUTO: 12.1 X10(3) UL (ref 4–11)

## 2024-04-24 PROCEDURE — 85025 COMPLETE CBC W/AUTO DIFF WBC: CPT

## 2024-04-24 PROCEDURE — 81338 MPL GENE COMMON VARIANTS: CPT

## 2024-04-24 PROCEDURE — 36415 COLL VENOUS BLD VENIPUNCTURE: CPT

## 2024-04-29 ENCOUNTER — TELEPHONE (OUTPATIENT)
Dept: HEMATOLOGY/ONCOLOGY | Facility: HOSPITAL | Age: 64
End: 2024-04-29

## 2024-04-29 ENCOUNTER — APPOINTMENT (OUTPATIENT)
Dept: HEMATOLOGY/ONCOLOGY | Facility: HOSPITAL | Age: 64
End: 2024-04-29
Attending: INTERNAL MEDICINE
Payer: MEDICARE

## 2024-04-29 NOTE — TELEPHONE ENCOUNTER
Called Aly regarding [facility] test that Dr. Davis ordered and informed him that he does not need to fast, however it can only be drawn Monday-Thursday. He voiced understanding. Will come Thursday to have it drawn.

## 2024-05-02 ENCOUNTER — LAB ENCOUNTER (OUTPATIENT)
Dept: LAB | Facility: HOSPITAL | Age: 64
End: 2024-05-02
Attending: INTERNAL MEDICINE
Payer: MEDICARE

## 2024-05-02 DIAGNOSIS — D47.1 CHRONIC MYELOSIS (HCC): ICD-10-CM

## 2024-05-02 PROCEDURE — 81219 CALR GENE COM VARIANTS: CPT

## 2024-05-13 ENCOUNTER — APPOINTMENT (OUTPATIENT)
Dept: HEMATOLOGY/ONCOLOGY | Facility: HOSPITAL | Age: 64
End: 2024-05-13
Attending: INTERNAL MEDICINE
Payer: MEDICARE

## 2024-05-16 ENCOUNTER — TELEPHONE (OUTPATIENT)
Dept: HEMATOLOGY/ONCOLOGY | Facility: HOSPITAL | Age: 64
End: 2024-05-16

## 2024-05-16 NOTE — TELEPHONE ENCOUNTER
Pt is calling back to make a f/u appt to go over test results. Please call pt back to schedule-NL

## 2024-05-17 NOTE — TELEPHONE ENCOUNTER
Called pt to set up appt with Dr Cid per ORLANDO Hassan, He will call back once he is home and has his calendar available-NL

## 2024-05-29 NOTE — PROGRESS NOTES
[facility] Progress Note      Patient Name: Aly Mott   YOB: 1960  Medical Record Number: [id number]  Attending Physician: Brain Cid M.D.     The 21st Century Cures Act makes medical notes like these available to patients in the interest of transparency. Please be advised this is a medical document. Medical documents are intended to carry relevant information, facts as evident, and the clinical opinion of the practitioner. The medical note is intended as peer to peer communication and may appear blunt or direct. It is written in medical language and may contain abbreviations or verbiage that are unfamiliar.      Date of Visit: 5/31/2024      Chief Complaint  Erythrocytosis and thrombocytosis - follow up.    History of Present Illness  Aly Mott is a 64 year old male discovered to have a hemoglobin of 18 g/dl and platelet count of 452 K/mcl on complete blood count performed on 02/09/2022. Before that patient had laboratory studies dating back to 11/2020 that showed an elevated hemoglobin. Beginning in 06/2021, patient's hemoglobin fell within the normal range but the platelet count was increased. It remained this way until the 02/09/2022 blood drawn.     JAK2 testing performed on 01/19/2021 at the [facility] showed negative JAK2 V617F mutation and no mutations in JAK2 exons 12-15. Iron and ferritin levels were normal.     Erythropoietin level on 02/17/2023 was normal at 6 mU/mL; hemoglobin at that time was 17.7 g/dl.     Mutational analysis for MPL and CALR in 05/2024 were negative.     Patient has a long smoking history but states that he cut down from 3 packs per day to 1 pack per day in 01/2024. He has never had a sleep study. He has no specific complaints.     Past Medical History (historical data, reviewed by physician)   Past Medical History:    Diabetic retinopathy (HCC)    Diverticulosis    Essential hypertension    Hemorrhoid    High  blood pressure    High cholesterol    History of blood in urine    Shows up in U/A    PONV (postoperative nausea and vomiting)    family hx    Renal disorder    recent protein in urine 22 - monitoring and following up after surgery    Visual impairment    glasses     Past Surgical History (historical data, reviewed by physician)   Past Surgical History:   Procedure Laterality Date    Chest x-ray 1 vw      21 at [place]     Colectomy  2022    LAPAROSCOPIC SIGMOID COLECTOMY POSSIBLE OPEN    Colonoscopy  2021    Hx of Polyps (Benign); No family Hx of Colon CA. 1st COLON at 49yo at 61yo    Colonoscopy      Ekg tracing for initial prev  2021 with [facility]    Stress test      21 [place]      Family History (historical data, reviewed by physician)   Family History   Problem Relation Age of Onset    Heart Disorder Father     Hypertension Father     Other (CAD) Father     Hypertension Mother     Dementia Mother     Heart Attack Mother     Other (DEAFNESS) Mother     Diabetes Maternal Grandfather     Stroke Maternal Grandfather      Social History (historical data, reviewed by physician)   Social History     Socioeconomic History    Marital status: Life Partner   Tobacco Use    Smoking status: Some Days     Current packs/day: 0.00     Types: Cigarettes, Cigars     Start date: 1985     Last attempt to quit: 2022     Years since quittin.2    Smokeless tobacco: Never    Tobacco comments:     Smoked 1 - 1.5 pack daily in past. 1.5 packs weekly..   Vaping Use    Vaping status: Never Used   Substance and Sexual Activity    Alcohol use: Yes     Alcohol/week: 0.0 - 3.0 standard drinks of alcohol     Comment: per week    Drug use: Yes     Frequency: 7.0 times per week     Types: Cannabis     Comment: 1/2 cigarrete per day    Sexual activity: Yes     Partners: Female     Current Medications    dapagliflozin (FARXIGA) 10 MG Oral Tab Take 1 tablet (10 mg total) by mouth daily. 90 tablet 2     atorvastatin 10 MG Oral Tab Take 1 tablet (10 mg total) by mouth nightly. 90 tablet 3    LISINOPRIL 20 MG Oral Tab TAKE 1 TABLET(20 MG) BY MOUTH DAILY 90 tablet 3    AMLODIPINE 10 MG Oral Tab TAKE 1 TABLET(10 MG) BY MOUTH DAILY 90 tablet 3     Allergies   Mr. Mott is allergic to aspirin, sulfa antibiotics, and hydrochlorothiazide.    Vital Signs   /74 (BP Location: Right arm, Patient Position: Sitting, Cuff Size: large)   Pulse 96   Temp 98.6 °F (37 °C) (Oral)   Resp 16   Ht 1.803 m (5' 11\")   Wt 105.9 kg (233 lb 6.4 oz)   SpO2 97%   BMI 32.55 kg/m²     Physical Examination   Constitutional      Well developed, well nourished. Appears close to chronological age. No apparent distress.   Head                   Normocephalic and atraumatic.  Eyes                   Conjunctiva clear; sclera anicteric.  ENMT                 External nose normal; external ears normal.  Respiratory          Normal effort; no respiratory distress.  Cardiovascular     Regular rate and rhythm.  Neurologic           Motor and sensory grossly intact.  Psychiatric          Mood and affect appropriate.    Laboratory   CBC W/ DIFFERENTIAL    Collection Time: 04/24/24  2:07 PM   Result Value Ref Range    WBC 12.1 (H) 4.0 - 11.0 x10(3) uL    RBC 5.77 3.80 - 5.80 x10(6)uL    HGB 16.9 13.0 - 17.5 g/dL    HCT 49.0 39.0 - 53.0 %    MCV 84.9 80.0 - 100.0 fL    MCH 29.3 26.0 - 34.0 pg    MCHC 34.5 31.0 - 37.0 g/dL    RDW-SD 43.7 35.1 - 46.3 fL    RDW 14.1 11.0 - 15.0 %    .0 150.0 - 450.0 10(3)uL    Neutrophil Absolute Prelim 8.07 (H) 1.50 - 7.70 x10 (3) uL    Neutrophil Absolute 8.07 (H) 1.50 - 7.70 x10(3) uL    Lymphocyte Absolute 2.82 1.00 - 4.00 x10(3) uL    Monocyte Absolute 0.90 0.10 - 1.00 x10(3) uL    Eosinophil Absolute 0.20 0.00 - 0.70 x10(3) uL    Basophil Absolute 0.05 0.00 - 0.20 x10(3) uL    Immature Granulocyte Absolute 0.07 0.00 - 1.00 x10(3) uL    Neutrophil % 66.6 %    Lymphocyte % 23.3 %    Monocyte % 7.4 %     Eosinophil % 1.7 %    Basophil % 0.4 %    Immature Granulocyte % 0.6 %     Impression and Plan   1.   Erythrocytosis: Patient's hemoglobin is normal. Notably, patient markedly decreased his smoking in 01/2024. The normal values today and previous workup argue against polycythemia vera and argue for a reactive process. In addition to continuing to decreasing smoking with a goal of cessation, I recommend he discuss obtaining a sleep study with his primary care physician to evaluate for sleep apnea.     2.   Thrombocytosis: Platelet count today is normal but at the upper limit of normal. JAK2, MPL, and CALR are negative which argues against, but does not completely rule out, essential thrombocythemia. It is reasonable to follow with observation for this issue at this time.     3.   Neutrophilia: May be reactive in nature. Chronic neutrophilia has been described in smokers. It is reasonable to follow with observation for now. Will check qualitative PCR for BCR/ABL with next blood draw.     4.   Screening for lung cancer: Patient is a candidate for annual lung cancer screening with low dose CT imaging. This can be done through his primary care physician.     Planned Follow Up   Patient will return for laboratory studies only in 3 months.    Encounter Time  Pre-charting/reviewing medical records: 20 minutes.  In room with patient obtaining history, performing exam, counseling on diagnosis, and reviewing plan: 20 minutes.  Orders/notes: - minutes.  Total time: 40 minutes.    Electronically signed by:    Brain Cid M.D.  System Medical Director of Oncology Services  [facility]

## 2024-05-31 ENCOUNTER — OFFICE VISIT (OUTPATIENT)
Dept: HEMATOLOGY/ONCOLOGY | Facility: HOSPITAL | Age: 64
End: 2024-05-31
Attending: SPECIALIST
Payer: MEDICARE

## 2024-05-31 VITALS
WEIGHT: 233.38 LBS | OXYGEN SATURATION: 97 % | DIASTOLIC BLOOD PRESSURE: 74 MMHG | SYSTOLIC BLOOD PRESSURE: 143 MMHG | RESPIRATION RATE: 16 BRPM | BODY MASS INDEX: 32.67 KG/M2 | HEIGHT: 71 IN | HEART RATE: 96 BPM | TEMPERATURE: 99 F

## 2024-05-31 DIAGNOSIS — D75.839 THROMBOCYTOSIS: ICD-10-CM

## 2024-05-31 DIAGNOSIS — D72.829 LEUKOCYTOSIS, UNSPECIFIED TYPE: ICD-10-CM

## 2024-05-31 DIAGNOSIS — D72.9 NEUTROPHILIA: Primary | ICD-10-CM

## 2024-05-31 PROCEDURE — 99215 OFFICE O/P EST HI 40 MIN: CPT | Performed by: SPECIALIST

## 2024-06-26 ENCOUNTER — TELEPHONE (OUTPATIENT)
Dept: INTERNAL MEDICINE CLINIC | Facility: CLINIC | Age: 64
End: 2024-06-26

## 2024-06-26 NOTE — TELEPHONE ENCOUNTER
Spoke to patient he is scheduled on 6/28 at 2 pm.    Patient would like to know if Dr Powell wants him to hold his B/P medications?  That was the instructions he received at the ER    Phone [phone]

## 2024-06-26 NOTE — TELEPHONE ENCOUNTER
Only available 130, 2 PM Friday 6/28.  Okay to use one of the hold slots.  Please let me know if the patient is able to take one of the slots.

## 2024-06-26 NOTE — TELEPHONE ENCOUNTER
Patient called, advises he was at [facility] this morning     Patient woke up and his face was swollen; eyes, nose, chin, right cheek   Redness on cheeks & chin  Nose was crusted over    Patient is being treated preventatively for shingles and was advised to stop taking lisinopril due to angio edema side effect    Requests call back to advise what Dr Powell may want to replace the lisinopril with and if he wants to see him for follow up   Patient would be available tomorrow afternoon or Friday afternoon     Patient has [facility] linked to his my chart so Dr can review his ER information     Call back # [phone]

## 2024-06-28 ENCOUNTER — OFFICE VISIT (OUTPATIENT)
Dept: INTERNAL MEDICINE CLINIC | Facility: CLINIC | Age: 64
End: 2024-06-28

## 2024-06-28 VITALS
BODY MASS INDEX: 33.18 KG/M2 | DIASTOLIC BLOOD PRESSURE: 88 MMHG | HEART RATE: 84 BPM | WEIGHT: 237 LBS | OXYGEN SATURATION: 97 % | HEIGHT: 71 IN | TEMPERATURE: 98 F | SYSTOLIC BLOOD PRESSURE: 134 MMHG

## 2024-06-28 DIAGNOSIS — R91.1 LUNG NODULE: ICD-10-CM

## 2024-06-28 DIAGNOSIS — D75.1 ERYTHROCYTOSIS: ICD-10-CM

## 2024-06-28 DIAGNOSIS — N04.9 NEPHROTIC SYNDROME: ICD-10-CM

## 2024-06-28 DIAGNOSIS — T78.3XXD ANGIOEDEMA, SUBSEQUENT ENCOUNTER: Primary | ICD-10-CM

## 2024-06-28 DIAGNOSIS — I25.119 CORONARY ARTERY DISEASE INVOLVING NATIVE CORONARY ARTERY OF NATIVE HEART WITH ANGINA PECTORIS (HCC): ICD-10-CM

## 2024-06-28 DIAGNOSIS — G47.33 OSA (OBSTRUCTIVE SLEEP APNEA): ICD-10-CM

## 2024-06-28 DIAGNOSIS — I10 ESSENTIAL HYPERTENSION: ICD-10-CM

## 2024-06-28 DIAGNOSIS — F17.209 NICOTINE DEPENDENCE WITH NICOTINE-INDUCED DISORDER, UNSPECIFIED NICOTINE PRODUCT TYPE: ICD-10-CM

## 2024-06-28 DIAGNOSIS — K57.90 DIVERTICULOSIS: ICD-10-CM

## 2024-06-28 PROCEDURE — 99215 OFFICE O/P EST HI 40 MIN: CPT | Performed by: INTERNAL MEDICINE

## 2024-06-28 RX ORDER — VALACYCLOVIR HYDROCHLORIDE 1 G/1
1000 TABLET, FILM COATED ORAL 3 TIMES DAILY
COMMUNITY
Start: 2024-06-26 | End: 2024-07-03

## 2024-06-28 RX ORDER — CEPHALEXIN 500 MG/1
500 CAPSULE ORAL 3 TIMES DAILY
COMMUNITY
Start: 2024-06-26

## 2024-06-28 RX ORDER — DIPHENHYDRAMINE HCL 25 MG
25 TABLET ORAL EVERY 6 HOURS PRN
COMMUNITY

## 2024-06-28 NOTE — PROGRESS NOTES
Chief Complaint:   Chief Complaint   Patient presents with    ER F/U     Allergic reaction?         HPI:     Mr. PADGETT is a 64 year old male PMHX coronary artery disease, history of nephrotic syndrome with stage I CKD, hypertension, hyperlipidemia, diverticulosis, erythrocytosis coming in for ER follow-up.    Of note, patient presented to [facility] for facial swelling.  He went to bed the night prior to ER visit 6/26, woke up with facial swelling.  No lip swelling, tongue swelling.  Was at the veterinary office but has not had an issue with this in the past.,  Treated with Benadryl, steroids, Pepcid.  Possible angioedema in setting of lisinopril use.,  Evaluated by ophthalmology without ocular involvement.  Was advised to hold lisinopril and follow-up with primary care.    Woke up Weds morning with swelling of the eye, nose, mouth. HE was getting a cold in  Tuesday evening. Recalled ER visit as above, on cephalexin and valacyclovir.     He has held his BP medications. His BP at home. 143/85.  He feels that he is fully recovered since the [facility] visit.    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  He has no physical neuropsychological limitations for him to proceed.      Past Medical History:    Diabetic retinopathy (HCC)    Diverticulosis    Essential hypertension    Hemorrhoid    High blood pressure    High cholesterol    History of blood in urine    Shows up in U/A    PONV (postoperative nausea and vomiting)    family hx    Renal disorder    recent protein in urine 2/9/22 - monitoring and following up after surgery    Visual impairment    glasses     Past Surgical History:   Procedure Laterality Date    Chest x-ray 1 vw      1/19/21 at [place]     Colectomy  02/23/2022    LAPAROSCOPIC SIGMOID COLECTOMY POSSIBLE OPEN    Colonoscopy  04/14/2021    Hx of Polyps (Benign); No family Hx of Colon CA. 1st COLON at 49yo at 59yo    Colonoscopy      Colonoscopy N/A  10/6/2023    Procedure: COLONOSCOPY;  Surgeon: Casey Thomas MD;  Location: [facility] MAIN OR    Ekg tracing for initial prev  2021 with [facility]    Stress test      21 [facility]      Social History:  Social History     Socioeconomic History    Marital status: Life Partner   Tobacco Use    Smoking status: Some Days     Current packs/day: 0.00     Types: Cigarettes, Cigars     Start date: 1985     Last attempt to quit: 2022     Years since quittin.3    Smokeless tobacco: Never    Tobacco comments:     Smoked 1 - 1.5 pack daily in past. 1.5 packs weekly..   Vaping Use    Vaping status: Never Used   Substance and Sexual Activity    Alcohol use: Yes     Alcohol/week: 0.0 - 3.0 standard drinks of alcohol     Comment: per week    Drug use: Yes     Frequency: 7.0 times per week     Types: Cannabis     Comment: 1/2 cigarrete per day    Sexual activity: Yes     Partners: Female     Social Determinants of Health     Financial Resource Strain: Low Risk  (2022)    [facility]    Overall Financial Resource Strain (CARDIA)     Difficulty of Paying Living Expenses: Not hard at all   Physical Activity: Sufficiently Active (2021)    Received from [facility]    Exercise Vital Sign     Days of Exercise per Week: 6 days     Minutes of Exercise per Session: 40 min   Stress: No Stress Concern Present (2021)    Received from [facility]    [place] [employer] - Occupational Stress Questionnaire     Feeling of Stress : Only a little   Social Connections: Socially Isolated (2021)    Received from [facility]    Social Connection and Isolation Panel [NHANES]     Frequency of Communication with Friends and Family: More than three times a week     Frequency of Social Gatherings with Friends and Family: Once a week     Attends [employer] Services: Never     Active Member of Clubs or Organizations: No     Attends Club or Organization  Meetings: Never     Marital Status:     Received from [facility]    Housing Stability     Family History:  Family History   Problem Relation Age of Onset    Heart Disorder Father     Hypertension Father     Other (CAD) Father     Hypertension Mother     Dementia Mother     Heart Attack Mother     Other (DEAFNESS) Mother     Diabetes Maternal Grandfather     Stroke Maternal Grandfather      Allergies:  Allergies   Allergen Reactions    Aspirin SWELLING    Lisinopril ANGIOEDEMA    Sulfa Antibiotics OTHER (SEE COMMENTS)     GI UPSET     Hydrochlorothiazide OTHER (SEE COMMENTS)     Indigestion after dinner, went away after 2 large glasses of drinking water      Current Meds:  Current Outpatient Medications   Medication Sig Dispense Refill    cephalexin 500 MG Oral Cap Take 1 capsule (500 mg total) by mouth 3 (three) times daily.      valACYclovir 1 G Oral Tab Take 1 tablet (1,000 mg total) by mouth 3 (three) times daily.      diphenhydrAMINE HCl 25 MG Oral Tab Take 1 tablet (25 mg total) by mouth every 6 (six) hours as needed for Itching.      dapagliflozin (FARXIGA) 10 MG Oral Tab Take 1 tablet (10 mg total) by mouth daily. 90 tablet 2    atorvastatin 10 MG Oral Tab Take 1 tablet (10 mg total) by mouth nightly. 90 tablet 3    AMLODIPINE 10 MG Oral Tab TAKE 1 TABLET(10 MG) BY MOUTH DAILY (Patient not taking: Reported on 6/28/2024) 90 tablet 3      Ready to quit: Not Answered  Counseling given: Not Answered  Tobacco comments: Smoked 1 - 1.5 pack daily in past. 1.5 packs weekly..       REVIEW OF SYSTEMS:   Positive Findings indicated in BOLD    Constitutional: Fever, Chills, Weight Gain, Weight Loss, Night Sweats, Fatigue, Malaise  ENT/Mouth:  Hearing Changes, Ear Pain, Nasal Congestion, Sinus Pain, Hoarseness, Sore throat, Rhinorrhea, Swallowing Difficulty  Eyes: Eye Pain, Swelling, Redness, Foreign Body, Discharge, Vision Changes  Cardiovascular: Chest Pain, SOB, PND, Dyspnea on Exertion,  Orthopnea, Claudication, Edema, Palpitations  Respiratory: Cough, Sputum, Wheezing, Shortness of breath  Gastrointestinal: Nausea, Vomiting, Diarrhea, Constipation, Pain, Heartburn, Dysphagia, Bloody stools, Tarry stools  Genitourinary: Dysmenorrhea, Dysuria, Urinary Frequency, Hematuria, Urinary Incontinence, Urgency,  Flank Pain  Musculoskeletal: Arthralgias, Myalgias, Joint Swelling, Joint Stiffness, Back Pain, Neck Pain  Integumentary: Skin Lesions, Pruritis, Hair Changes, Jaundice, Nail changes  Neuro: Weakness, Numbness, Paresthesias, Loss of Consciousness, Syncope, Dizziness, Headache, Falls  Psych: Anxiety, Depression, Insomnia, Suicidal Ideation, Homicidal ideation, Memory Changes  Heme/Lymph: Bruising, Bleeding, Lymphadenopathy  Endocrine: Polyuria, Polydipsia, Temperature Intolerance    EXAM:   Vital Signs:  Blood pressure 134/88, pulse 84, temperature 97.7 °F (36.5 °C), height 5' 11\" (1.803 m), weight 237 lb (107.5 kg), SpO2 97%.     Constitutional: No acute distress. Alert and oriented x 3.  Eyes: EOMI, PERRLA, clear sclera b/l  HENT: NCAT, Moist mucous membranes, Oropharynx without erythema or exudates  Cardiovascular: S1, S2, no S3, no S4, Regular rate and rhythm, No murmurs/gallops/rubs.   Respiratory: Clear to auscultation bilaterally.  No wheezes/rales/rhonchi  Gastrointestinal: Soft, nontender, nondistended. Positive bowel sounds x 4. No rebound tenderness. No hepatomegaly, No splenomegaly  Genitourinary: No CVA tenderness bilaterally  Neurologic: No focal neurological deficits, CN II-XII intact, light touch intact, MSK Strength 5/5 and symmetric in all extremities, normal gait, 2+ patellar tendon  Musculoskeletal: Full range of motion of all extremities, no clubbing/swelling/edema  Skin: No lesions, No erythema, no jaundice, Cap Refill < 2s  Psychiatric: Appropriate mood and affect  Heme/Lymph/Immune: No cervical LAD        DATA REVIEWED   Labs:  Recent Results (from the past 8760 hour(s))   Comp  Metabolic Panel (14)    Collection Time: 03/04/24  3:21 PM   Result Value Ref Range    Glucose 87 70 - 99 mg/dL    Sodium 136 136 - 145 mmol/L    Potassium 4.6 3.5 - 5.1 mmol/L    Chloride 107 98 - 112 mmol/L    CO2 22.0 21.0 - 32.0 mmol/L    Anion Gap 7 0 - 18 mmol/L    BUN 14 9 - 23 mg/dL    Creatinine 0.97 0.70 - 1.30 mg/dL    BUN/CREA Ratio 14.4 10.0 - 20.0    Calcium, Total 9.6 8.7 - 10.4 mg/dL    Calculated Osmolality 282 275 - 295 mOsm/kg    eGFR-Cr 87 >=60 mL/min/1.73m2    ALT 27 10 - 49 U/L    AST 27 <=34 U/L    Alkaline Phosphatase 79 45 - 117 U/L    Bilirubin, Total 0.4 0.2 - 1.1 mg/dL    Total Protein 6.6 5.7 - 8.2 g/dL    Albumin 3.9 3.2 - 4.8 g/dL    Globulin  2.7 (L) 2.8 - 4.4 g/dL    A/G Ratio 1.4 1.0 - 2.0    Patient Fasting for CMP? No      *Note: Due to a large number of results and/or encounters for the requested time period, some results have not been displayed. A complete set of results can be found in Results Review.       Recent Results (from the past 8760 hour(s))   CBC W/ DIFFERENTIAL    Collection Time: 04/24/24  2:07 PM   Result Value Ref Range    WBC 12.1 (H) 4.0 - 11.0 x10(3) uL    RBC 5.77 3.80 - 5.80 x10(6)uL    HGB 16.9 13.0 - 17.5 g/dL    HCT 49.0 39.0 - 53.0 %    MCV 84.9 80.0 - 100.0 fL    MCH 29.3 26.0 - 34.0 pg    MCHC 34.5 31.0 - 37.0 g/dL    RDW-SD 43.7 35.1 - 46.3 fL    RDW 14.1 11.0 - 15.0 %    .0 150.0 - 450.0 10(3)uL    Neutrophil Absolute Prelim 8.07 (H) 1.50 - 7.70 x10 (3) uL    Neutrophil Absolute 8.07 (H) 1.50 - 7.70 x10(3) uL    Lymphocyte Absolute 2.82 1.00 - 4.00 x10(3) uL    Monocyte Absolute 0.90 0.10 - 1.00 x10(3) uL    Eosinophil Absolute 0.20 0.00 - 0.70 x10(3) uL    Basophil Absolute 0.05 0.00 - 0.20 x10(3) uL    Immature Granulocyte Absolute 0.07 0.00 - 1.00 x10(3) uL    Neutrophil % 66.6 %    Lymphocyte % 23.3 %    Monocyte % 7.4 %    Eosinophil % 1.7 %    Basophil % 0.4 %    Immature Granulocyte % 0.6 %     *Note: Due to a large number of results  and/or encounters for the requested time period, some results have not been displayed. A complete set of results can be found in Results Review.       Imaging:  CT chest 1/22/2024        Impression   CONCLUSION: Smooth well-circumscribed benign morphology pulmonary nodule in the medial segment of the middle lobe measures 13 x 9 millimeter .  This measured 9 x 7 millimeter on the 1st available chest CT of August 2021; the growth rate is also  suggestive of benign/indolent process         ASSESSMENT AND PLAN:     Angioedema  ER visit at [facility] reviewed.  Was discharged on preventative treatment for shingles, advised to stop taking lisinopril due to concern for angioedema  - Started on Valtrex empirically for shingles  - Also on Keflex for possible cellulitis  - Lisinopril discontinued, will check blood pressures at home.  If needed, we can consider starting alternative such as losartan    Coronary artery disease  Severe atherosclerosis noted on CT scan of the chest as an incidental finding.  He does have family history of heart disease.  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 Notable microalbuminuria.   -  Started on Farxiga.  Plan to repeat urine studies with nephrology  - Follows with nephrology, Dr. Whatley.  Deferred renal biopsy per patient preference as proteinuria has stabilized.  Continue to proceed on last nephrology visit 4/11/2024     History of tobacco use  Lung nodule  Has successfully stopped smoking.  Notable COPD changes on CT scan, but asymptomatic.  - History of lung nodule, with last CT scan in January.  Small well-circumscribed benign morphology pulmonary  nodule 13 x 9 mm.  Suggestive of benign, indolent process.  - Will plan to consider further annual screenings.     Hypertension  -Blood pressure today at goal  - Check blood pressures at home  - Continue with home amlodipine 10 mg daily     I would like to see how his blood pressures look while on amlodipine monotherapy.  - Tonight, he will check his blood pressure before resuming his amlodipine which he normally takes at nighttime  - Then he will take blood pressure reading before caffeine intake in the morning, then 1 hour after his amlodipine administration  - On Monday, he will discuss how his numbers are looking.  We need to balance the losartan blood pressure effects, nephro protective effects, and potassium balance.     Hyperlipidemia  -Last lipid panel 1 year ago with overall good control  - Repeat fasting lipid panel  - Continue with atorvastatin 10 mg nightly     Pulmonary nodule  - CT Chest is stable, being monitored  by Dr. Davis.      Diverticulosis  History of recurrent diverticulitis status post colectomy  - No recurrences, last colonoscopy 10/6/2023 with Dr. Thomas     Erythrocytosis  JAK2 mutation negative.  Likely secondary to prior tobacco use  - Advised sleep study per Dr. Cid sleep apnea    Neutrophilia  -May be related to smoking as well  - Checking for BCR-able per Dr. Cid           Orders This Visit:  No orders of the defined types were placed in this encounter.      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  There are no physical neuropsychological limitations for him to continue.  We completed the paperwork together in clinic.    Spent 40 minutes obtaining history, evaluating patient, discussing treatment options, diet, exercise, review of available labs and radiology  reports, and completing documentation.       Return to clinic in 3-6 mo for follow-up    Eric Powell MD, 06/28/24, 3:17 PM

## 2024-06-28 NOTE — PATIENT INSTRUCTIONS
You are seen in clinic today for post ER follow-up.  We reviewed your ER course with concern for possible shingles, cellulitis, angioedema  - Complete the course of antiviral and antibacterial antibiotics  - We will need to discontinue lisinopril, check blood pressures at home  - If blood pressures remain elevated, we will need to switch to losartan.  Will need to determine the appropriate dose (either 25 or 50 mg) to ensure adequate blood pressure control, protection of the kidneys long-term from chronic kidney disease, and reducing risk for high potassium levels.    Tonight:  - Check your blood pressure before taking your usual amlodipine dose  - Go ahead and resume amlodipine 10 mg nightly as you have been taking before    For Saturday and Sunday:  - Check of the blood pressure before taking any caffeine/coffee.  You may have to delay your cup of coffee in the morning to ensure we have an appropriate level of the blood pressure.  - Take your amlodipine in the evening, then check your blood sugar pressure 1 hour minimum after your blood pressure    He may benefit from sleep study as recommended by Dr. Cid  - This is for the high red blood cell count    Similarly, with smoking history, you may benefit from CT lung screen  - This is for lung cancer screening in setting of smoking  - Try your best to quit altogether from smoking.    Return to clinic  in 3 to 4 months

## 2024-07-01 ENCOUNTER — TELEPHONE (OUTPATIENT)
Dept: INTERNAL MEDICINE CLINIC | Facility: CLINIC | Age: 64
End: 2024-07-01

## 2024-07-01 ENCOUNTER — PATIENT MESSAGE (OUTPATIENT)
Dept: INTERNAL MEDICINE CLINIC | Facility: CLINIC | Age: 64
End: 2024-07-01

## 2024-07-01 RX ORDER — LOSARTAN POTASSIUM 50 MG/1
50 TABLET ORAL DAILY
Qty: 90 TABLET | Refills: 3 | Status: SHIPPED | OUTPATIENT
Start: 2024-07-01

## 2024-07-01 NOTE — TELEPHONE ENCOUNTER
From: Aly Mott  To: Eric Powell  Sent: 7/1/2024 6:56 AM CDT  Subject: BP Monitoring    Dr. Powell, attached is the BP monitoring you requested. Please use Brian at [facility] in [place] for my new prescription.    Feel free to have office call me if you need anything else. Thank you.

## 2024-07-01 NOTE — TELEPHONE ENCOUNTER
Patient called to confirm message with BP were received for new Blood pressure prescription     Pharmacy - [facility] in [place]

## 2024-07-02 NOTE — TELEPHONE ENCOUNTER
Left message to call back.   
Patient contacted and relayed 's message below. Patient verbalized understanding.   
Patient returning our call, nurse unavailable at this time. Please call patient back.    
Please notify patient that I did send for the losartan to 50 mg once a day to replace his lisinopril.  Check the blood pressures once a day moving forward, but vary the times sometimes in the morning before any coffee and sometimes in the evening 1 hour after he takes medication.  He can take losartan which ever time he prefers    Send us a new Agitar message or phone call in 2 weeks  
To  P:          From: Aly Mott  To: Eric Powell  Sent: 7/1/2024  6:56 AM CDT  Subject: BP Monitoring    Dr. Powell, attached is the BP monitoring you requested.  Please use Brian at [facility] in [place] for my new prescription.    Feel free to have office call me if you need anything else.  Thank you.            
MELANIE

## 2024-07-05 RX ORDER — DAPAGLIFLOZIN 10 MG/1
10 TABLET, FILM COATED ORAL DAILY
Qty: 90 TABLET | Refills: 2 | Status: SHIPPED | OUTPATIENT
Start: 2024-07-05

## 2024-07-05 NOTE — TELEPHONE ENCOUNTER
Last  office  visit 4/11/2024    Return to  office  6 months  (10/11/2024)    Follow up  appointment   10/11/2024

## 2024-07-17 ENCOUNTER — PATIENT MESSAGE (OUTPATIENT)
Dept: INTERNAL MEDICINE CLINIC | Facility: CLINIC | Age: 64
End: 2024-07-17

## 2024-07-18 NOTE — TELEPHONE ENCOUNTER
From: Aly Mott  To: Eric Powell  Sent: 7/17/2024 9:28 PM CDT  Subject: 2 Week BP Monitoring    Dr. Powell, attached is the 2 week BP tracking you requested. Thank you.

## 2024-08-15 RX ORDER — AMLODIPINE BESYLATE 10 MG/1
10 TABLET ORAL DAILY
Qty: 90 TABLET | Refills: 3 | Status: SHIPPED | OUTPATIENT
Start: 2024-08-15

## 2024-08-15 NOTE — TELEPHONE ENCOUNTER
Patient called, needs refill for Amlodipine 10 mg  Running low, 5 tablets left  Pharmacy - [facility] [address] [address]

## 2024-08-15 NOTE — TELEPHONE ENCOUNTER
Refill request is for a maintenance medication and has met the criteria specified in the Ambulatory Medication Refill Standing Order for eligibility, visits, laboratory, alerts and was sent to the requested pharmacy.    Requested Prescriptions     Signed Prescriptions Disp Refills    amLODIPine 10 MG Oral Tab 90 tablet 3     Sig: Take 1 tablet (10 mg total) by mouth daily.     Authorizing Provider: LACHO LOMELI     Ordering User: HODAN MEANS

## 2024-09-10 ENCOUNTER — APPOINTMENT (OUTPATIENT)
Dept: HEMATOLOGY/ONCOLOGY | Facility: HOSPITAL | Age: 64
End: 2024-09-10
Attending: SPECIALIST
Payer: MEDICARE

## 2024-09-24 ENCOUNTER — NURSE ONLY (OUTPATIENT)
Dept: HEMATOLOGY/ONCOLOGY | Facility: HOSPITAL | Age: 64
End: 2024-09-24
Attending: SPECIALIST
Payer: MEDICARE

## 2024-09-24 DIAGNOSIS — D72.9 NEUTROPHILIA: ICD-10-CM

## 2024-09-24 DIAGNOSIS — D72.829 LEUKOCYTOSIS, UNSPECIFIED TYPE: ICD-10-CM

## 2024-09-24 LAB
BASOPHILS # BLD AUTO: 0.12 X10(3) UL (ref 0–0.2)
BASOPHILS NFR BLD AUTO: 0.8 %
DEPRECATED RDW RBC AUTO: 42.9 FL (ref 35.1–46.3)
EOSINOPHIL # BLD AUTO: 0.31 X10(3) UL (ref 0–0.7)
EOSINOPHIL NFR BLD AUTO: 2.1 %
ERYTHROCYTE [DISTWIDTH] IN BLOOD BY AUTOMATED COUNT: 13.9 % (ref 11–15)
HCT VFR BLD AUTO: 50.6 %
HGB BLD-MCNC: 17.6 G/DL
IMM GRANULOCYTES # BLD AUTO: 0.12 X10(3) UL (ref 0–1)
IMM GRANULOCYTES NFR BLD: 0.8 %
LYMPHOCYTES # BLD AUTO: 3.33 X10(3) UL (ref 1–4)
LYMPHOCYTES NFR BLD AUTO: 23 %
MCH RBC QN AUTO: 29.3 PG (ref 26–34)
MCHC RBC AUTO-ENTMCNC: 34.8 G/DL (ref 31–37)
MCV RBC AUTO: 84.2 FL
MONOCYTES # BLD AUTO: 0.94 X10(3) UL (ref 0.1–1)
MONOCYTES NFR BLD AUTO: 6.5 %
NEUTROPHILS # BLD AUTO: 9.64 X10 (3) UL (ref 1.5–7.7)
NEUTROPHILS # BLD AUTO: 9.64 X10(3) UL (ref 1.5–7.7)
NEUTROPHILS NFR BLD AUTO: 66.8 %
PLATELET # BLD AUTO: 423 10(3)UL (ref 150–450)
RBC # BLD AUTO: 6.01 X10(6)UL
WBC # BLD AUTO: 14.5 X10(3) UL (ref 4–11)

## 2024-09-24 PROCEDURE — 81207 BCR/ABL1 GENE MINOR BP: CPT

## 2024-09-24 PROCEDURE — 81206 BCR/ABL1 GENE MAJOR BP: CPT

## 2024-09-24 PROCEDURE — 36415 COLL VENOUS BLD VENIPUNCTURE: CPT

## 2024-09-24 PROCEDURE — 81208 BCR/ABL1 GENE OTHER BP: CPT

## 2024-09-24 PROCEDURE — 85025 COMPLETE CBC W/AUTO DIFF WBC: CPT

## 2024-10-01 LAB — INTERP BCRABL: NEGATIVE

## 2024-10-02 ENCOUNTER — PATIENT MESSAGE (OUTPATIENT)
Dept: HEMATOLOGY/ONCOLOGY | Facility: HOSPITAL | Age: 64
End: 2024-10-02

## 2024-10-02 NOTE — TELEPHONE ENCOUNTER
From: Aly Mott  Sent: 10/2/2024 3:49 PM CDT  To: Argentina Cid Rns  Subject: BCR/ABL test    Dr. Darleen Merlos’s. Thank you for the follow up and I agree with you on continuing to see each other every 6 months. Please have the office set that up.    Thank you again.

## 2024-10-07 ENCOUNTER — LAB ENCOUNTER (OUTPATIENT)
Dept: LAB | Facility: HOSPITAL | Age: 64
End: 2024-10-07
Attending: INTERNAL MEDICINE
Payer: MEDICARE

## 2024-10-07 DIAGNOSIS — R80.9 PROTEINURIA, UNSPECIFIED TYPE: ICD-10-CM

## 2024-10-07 LAB
ALBUMIN SERPL-MCNC: 4 G/DL (ref 3.2–4.8)
ANION GAP SERPL CALC-SCNC: 2 MMOL/L (ref 0–18)
BILIRUB UR QL: NEGATIVE
BUN BLD-MCNC: 18 MG/DL (ref 9–23)
BUN/CREAT SERPL: 17.1 (ref 10–20)
CALCIUM BLD-MCNC: 9.6 MG/DL (ref 8.7–10.4)
CHLORIDE SERPL-SCNC: 108 MMOL/L (ref 98–112)
CLARITY UR: CLEAR
CO2 SERPL-SCNC: 28 MMOL/L (ref 21–32)
COLOR UR: COLORLESS
CREAT BLD-MCNC: 1.05 MG/DL
CREAT UR-SCNC: 12.3 MG/DL
EGFRCR SERPLBLD CKD-EPI 2021: 78 ML/MIN/1.73M2 (ref 60–?)
GLUCOSE BLD-MCNC: 112 MG/DL (ref 70–99)
GLUCOSE UR-MCNC: 500 MG/DL
KETONES UR-MCNC: NEGATIVE MG/DL
LEUKOCYTE ESTERASE UR QL STRIP.AUTO: NEGATIVE
MICROALBUMIN UR-MCNC: 114.8 MG/DL
MICROALBUMIN/CREAT 24H UR-RTO: 9333.3 UG/MG (ref ?–30)
NITRITE UR QL STRIP.AUTO: NEGATIVE
OSMOLALITY SERPL CALC.SUM OF ELEC: 289 MOSM/KG (ref 275–295)
PH UR: 6.5 [PH] (ref 5–8)
PHOSPHATE SERPL-MCNC: 4 MG/DL (ref 2.4–5.1)
POTASSIUM SERPL-SCNC: 4.7 MMOL/L (ref 3.5–5.1)
PROT UR-MCNC: 200 MG/DL
SODIUM SERPL-SCNC: 138 MMOL/L (ref 136–145)
SP GR UR STRIP: <1.005 (ref 1–1.03)
UROBILINOGEN UR STRIP-ACNC: NORMAL
VIT D+METAB SERPL-MCNC: 18.4 NG/ML (ref 30–100)

## 2024-10-07 PROCEDURE — 36415 COLL VENOUS BLD VENIPUNCTURE: CPT

## 2024-10-07 PROCEDURE — 81001 URINALYSIS AUTO W/SCOPE: CPT

## 2024-10-07 PROCEDURE — 80069 RENAL FUNCTION PANEL: CPT

## 2024-10-07 PROCEDURE — 82570 ASSAY OF URINE CREATININE: CPT

## 2024-10-07 PROCEDURE — 82043 UR ALBUMIN QUANTITATIVE: CPT

## 2024-10-07 PROCEDURE — 82306 VITAMIN D 25 HYDROXY: CPT

## 2024-10-09 ENCOUNTER — OFFICE VISIT (OUTPATIENT)
Facility: CLINIC | Age: 64
End: 2024-10-09
Payer: MEDICARE

## 2024-10-09 VITALS
SYSTOLIC BLOOD PRESSURE: 131 MMHG | DIASTOLIC BLOOD PRESSURE: 76 MMHG | BODY MASS INDEX: 33 KG/M2 | HEART RATE: 92 BPM | WEIGHT: 239 LBS

## 2024-10-09 DIAGNOSIS — I10 HYPERTENSION, UNSPECIFIED TYPE: ICD-10-CM

## 2024-10-09 DIAGNOSIS — N18.1 STAGE 1 CHRONIC KIDNEY DISEASE: ICD-10-CM

## 2024-10-09 DIAGNOSIS — R80.9 PROTEINURIA, UNSPECIFIED TYPE: Primary | ICD-10-CM

## 2024-10-09 PROCEDURE — 99214 OFFICE O/P EST MOD 30 MIN: CPT | Performed by: INTERNAL MEDICINE

## 2024-10-09 NOTE — PROGRESS NOTES
Progress Note     Aly Mott    Here for follow up  In June had an episode of facial swelling - switched from lisinopril to losartan    HISTORY:  Past Medical History:    Diabetic retinopathy (HCC)    Diverticulosis    Essential hypertension    Hemorrhoid    High blood pressure    High cholesterol    History of blood in urine    Shows up in U/A    PONV (postoperative nausea and vomiting)    family hx    Renal disorder    recent protein in urine 2/9/22 - monitoring and following up after surgery    Visual impairment    glasses      Past Surgical History:   Procedure Laterality Date    Chest x-ray 1 vw      1/19/21 at [place]     Colectomy  02/23/2022    LAPAROSCOPIC SIGMOID COLECTOMY POSSIBLE OPEN    Colonoscopy  04/14/2021    Hx of Polyps (Benign); No family Hx of Colon CA. 1st COLON at 49yo at 59yo    Colonoscopy      Colonoscopy N/A 10/6/2023    Procedure: COLONOSCOPY;  Surgeon: Casey Thomas MD;  Location: [facility] MAIN OR    Ekg tracing for initial prev  01/19/2021 1/19/21 with [place]    Stress test      1/19/21 [place]            Medications (Active prior to today's visit):  Current Outpatient Medications   Medication Sig Dispense Refill    amLODIPine 10 MG Oral Tab Take 1 tablet (10 mg total) by mouth daily. 90 tablet 3    FARXIGA 10 MG Oral Tab TAKE 1 TABLET(10 MG) BY MOUTH DAILY 90 tablet 2    losartan 50 MG Oral Tab Take 1 tablet (50 mg total) by mouth daily. 90 tablet 3    diphenhydrAMINE HCl 25 MG Oral Tab Take 1 tablet (25 mg total) by mouth every 6 (six) hours as needed for Itching.      atorvastatin 10 MG Oral Tab Take 1 tablet (10 mg total) by mouth nightly. 90 tablet 3    cephalexin 500 MG Oral Cap Take 1 capsule (500 mg total) by mouth 3 (three) times daily.         Allergies:  Allergies[1]      ROS:     Constitutional:  Negative for decreased activity, fever, irritability and lethargy  ENMT:  Negative for ear drainage, hearing loss and nasal drainage  Eyes:  Negative for eye discharge  and vision loss  Cardiovascular:  Negative for chest pain, sob  Respiratory:  Negative for cough, dyspnea and wheezing  Gastrointestinal:  Negative for abdominal pain, constipation  Genitourinary:  Negative for dysuria and hematuria  Endocrine:  Negative for abnormal sleep patterns  Hema/Lymph:  Negative for easy bleeding and easy bruising  Integumentary:  Negative for pruritus and rash  Musculoskeletal:  Negative for bone/joint symptoms  Neurological:  Negative for gait disturbance  Psychiatric:  Negative for inappropriate interaction and psychiatric symptoms      Vitals:    10/09/24 1409   BP: 131/76   Pulse: 92       PHYSICAL EXAM:   Constitutional: appears well hydrated alert and responsive   Head/Face: normocephalic  Eyes/Vision: normal extraocular motion is intact  Nose/Mouth/Throat:mucous membranes are moist   Neck/Thyroid: neck is supple without adenopathy  Lymphatic: no abnormal cervical, supraclavicular adenopathy is noted  Respiratory:  lungs are clear to auscultation bilaterally  Cardiovascular: regular rate and rhythm   Abdomen: soft, non-tender, non-distended, BS normal  Skin/Hair: no unusual rashes present, no abnormal bruising noted  Musculoskeletal: no deformities  Extremities: no edema  Neurological:  Grossly normal       Lab Results   Component Value Date     (H) 10/07/2024     10/07/2024    K 4.7 10/07/2024     10/07/2024    CO2 28.0 10/07/2024    ANIONGAP 2 10/07/2024    BUN 18 10/07/2024    CREATSERUM 1.05 10/07/2024    CA 9.6 10/07/2024    OSMOCALC 289 10/07/2024    EGFRCR 78 10/07/2024    ALB 4.0 10/07/2024    PHOS 4.0 10/07/2024         ASSESSMENT/PLAN:   Assessment   1. Proteinuria, unspecified type  Slightly higher than before  Pt took his labs in the AM this time  Pt will begin 5000 international unit of Vittamin D    2. Stage 1 chronic kidney disease  On Farxiga    3. Hypertension, unspecified type  On norvasc, losartan             Orders This Visit:  No orders of the  defined types were placed in this encounter.      Meds This Visit:  Requested Prescriptions      No prescriptions requested or ordered in this encounter       Imaging & Referrals:  None     10/9/2024   ALMA MCKEON MD    Return in about 6 months (around 4/9/2025).         [1]   Allergies  Allergen Reactions    Aspirin SWELLING    Lisinopril ANGIOEDEMA    Sulfa Antibiotics OTHER (SEE COMMENTS)     GI UPSET     Hydrochlorothiazide OTHER (SEE COMMENTS)     Indigestion after dinner, went away after 2 large glasses of drinking water

## 2024-10-11 ENCOUNTER — OFFICE VISIT (OUTPATIENT)
Dept: INTERNAL MEDICINE CLINIC | Facility: CLINIC | Age: 64
End: 2024-10-11

## 2024-10-11 VITALS
HEART RATE: 85 BPM | WEIGHT: 237 LBS | HEIGHT: 71 IN | SYSTOLIC BLOOD PRESSURE: 132 MMHG | BODY MASS INDEX: 33.18 KG/M2 | OXYGEN SATURATION: 96 % | DIASTOLIC BLOOD PRESSURE: 70 MMHG | TEMPERATURE: 98 F

## 2024-10-11 DIAGNOSIS — I25.119 CORONARY ARTERY DISEASE INVOLVING NATIVE CORONARY ARTERY OF NATIVE HEART WITH ANGINA PECTORIS (HCC): ICD-10-CM

## 2024-10-11 DIAGNOSIS — D75.1 ERYTHROCYTOSIS: ICD-10-CM

## 2024-10-11 DIAGNOSIS — Z72.0 TOBACCO ABUSE: ICD-10-CM

## 2024-10-11 DIAGNOSIS — Z13.29 SCREENING FOR THYROID DISORDER: ICD-10-CM

## 2024-10-11 DIAGNOSIS — G47.33 OSA (OBSTRUCTIVE SLEEP APNEA): ICD-10-CM

## 2024-10-11 DIAGNOSIS — R91.1 LUNG NODULE: ICD-10-CM

## 2024-10-11 DIAGNOSIS — Z13.0 SCREENING FOR DEFICIENCY ANEMIA: ICD-10-CM

## 2024-10-11 DIAGNOSIS — Z13.220 SCREENING FOR LIPOID DISORDERS: ICD-10-CM

## 2024-10-11 DIAGNOSIS — N04.9 NEPHROTIC SYNDROME: ICD-10-CM

## 2024-10-11 DIAGNOSIS — Z12.5 SCREENING FOR PROSTATE CANCER: ICD-10-CM

## 2024-10-11 DIAGNOSIS — E55.9 VITAMIN D DEFICIENCY: ICD-10-CM

## 2024-10-11 DIAGNOSIS — I10 ESSENTIAL HYPERTENSION: ICD-10-CM

## 2024-10-11 DIAGNOSIS — T78.3XXD ANGIOEDEMA, SUBSEQUENT ENCOUNTER: Primary | ICD-10-CM

## 2024-10-11 PROCEDURE — 99214 OFFICE O/P EST MOD 30 MIN: CPT | Performed by: INTERNAL MEDICINE

## 2024-10-11 NOTE — PATIENT INSTRUCTIONS
You are seen in clinic today for follow-up.  Today, we did review most recent set of blood work  - With continued vitamin D supplementation recommended by Dr. Whatley    Blood pressure seems better controlled with amlodipine and losartan  - Okay to spread out blood pressure checks as they have been at goal    -Concern for symptoms of sleep apnea.  Where we can obtain a home sleep study to get the diagnosis and severity  - We did discuss conservative measures that can improve snoring and sleep apnea:    Targeting weight loss over time  Avoidance of sedating medications or substances such as alcohol before bedtime  Can use an incline with using 2 or 3 pillows at bedtime  Can consider side sleeping  Use of an oral appliance as an alternative to CPAP.  May consider over-the-counter oral or nasal sleep appliances.  If not effective, can consider discussing with dentistry/oral surgery regarding specialized oral appliance.    You may schedule the stress test anytime as our follow-up    We did place fasting blood work to be completed prior to your next annual physical examination in 4 months months

## 2024-10-11 NOTE — PROGRESS NOTES
Chief Complaint:   Chief Complaint   Patient presents with    Follow - Up     BP meds     HPI:     Mr. PADGETT is a 64 year old male PMHX coronary artery disease, history of nephrotic syndrome with stage I CKD, hypertension, hyperlipidemia, diverticulosis, erythrocytosis coming in for follow-up.    No pain. He is doing well. Losartan is doing better with the diastolic. < 130/ < 70-80s.  Infrequently checks blood pressures at home as they are much better and stable.    Some side effect, constipation with the losartan. Drinks lots of water, uses stool softener.     He is starting on Vitamin D    Followed with Dr. Cid of Hematology. Sleep study was to be considered.  Unsure if he would use a CPAP, but open to having the sleep study confirmed if sleep apnea is apparent      Past Medical History:    Diabetic retinopathy (HCC)    Diverticulosis    Essential hypertension    Hemorrhoid    High blood pressure    High cholesterol    History of blood in urine    Shows up in U/A    PONV (postoperative nausea and vomiting)    family hx    Renal disorder    recent protein in urine 2/9/22 - monitoring and following up after surgery    Visual impairment    glasses     Past Surgical History:   Procedure Laterality Date    Chest x-ray 1 vw 1/19/21 at [place]     Colectomy  02/23/2022    LAPAROSCOPIC SIGMOID COLECTOMY POSSIBLE OPEN    Colonoscopy  04/14/2021    Hx of Polyps (Benign); No family Hx of Colon CA. 1st COLON at 51yo at 61yo    Colonoscopy      Colonoscopy N/A 10/6/2023    Procedure: COLONOSCOPY;  Surgeon: Casey Thomas MD;  Location: [facility] MAIN OR    Ekg tracing for initial prev  01/19/2021 1/19/21 with [place]    Stress test      1/19/21 [place]      Social History:  Social History     Socioeconomic History    Marital status: Life Partner   Tobacco Use    Smoking status: Some Days     Current packs/day: 0.00     Types: Cigarettes, Cigars     Start date: 2/20/1985     Last attempt to quit: 2/20/2022     Years since  quittin.6    Smokeless tobacco: Never    Tobacco comments:     Smoked 1 - 1.5 pack daily in past. 1.5 packs weekly..   Vaping Use    Vaping status: Never Used   Substance and Sexual Activity    Alcohol use: Yes     Alcohol/week: 0.0 - 3.0 standard drinks of alcohol     Comment: per week    Drug use: Yes     Frequency: 7.0 times per week     Types: Cannabis     Comment: 1/2 cigarrete per day    Sexual activity: Yes     Partners: Female     Social Drivers of Health     Financial Resource Strain: Low Risk  (2022)    [facility]    Overall Financial Resource Strain (CARDIA)     Difficulty of Paying Living Expenses: Not hard at all   Physical Activity: Sufficiently Active (2021)    Received from [facility]    Exercise Vital Sign     Days of Exercise per Week: 6 days     Minutes of Exercise per Session: 40 min   Stress: No Stress Concern Present (2021)    Received from [facility]    [employer] [place] [employer] - Occupational Stress Questionnaire     Feeling of Stress : Only a little   Social Connections: Socially Isolated (2021)    Received from [facility]    Social Connection and Isolation Panel [NHANES]     Frequency of Communication with Friends and Family: More than three times a week     Frequency of Social Gatherings with Friends and Family: Once a week     Attends Judaism Services: Never     Active Member of Clubs or Organizations: No     Attends Club or Organization Meetings: Never     Marital Status:     Received from [facility]    Housing Stability     Family History:  Family History   Problem Relation Age of Onset    Heart Disorder Father     Hypertension Father     Other (CAD) Father     Hypertension Mother     Dementia Mother     Heart Attack Mother     Other (DEAFNESS) Mother     Diabetes Maternal Grandfather     Stroke Maternal Grandfather      Allergies:  Allergies   Allergen Reactions     Aspirin SWELLING    Lisinopril ANGIOEDEMA    Sulfa Antibiotics OTHER (SEE COMMENTS)     GI UPSET     Hydrochlorothiazide OTHER (SEE COMMENTS)     Indigestion after dinner, went away after 2 large glasses of drinking water      Current Meds:  Current Outpatient Medications   Medication Sig Dispense Refill    Cholecalciferol 125 MCG (5000 UT) Oral Tab Take 1 tablet (5,000 Units total) by mouth daily.      amLODIPine 10 MG Oral Tab Take 1 tablet (10 mg total) by mouth daily. 90 tablet 3    FARXIGA 10 MG Oral Tab TAKE 1 TABLET(10 MG) BY MOUTH DAILY 90 tablet 2    losartan 50 MG Oral Tab Take 1 tablet (50 mg total) by mouth daily. 90 tablet 3    diphenhydrAMINE HCl 25 MG Oral Tab Take 1 tablet (25 mg total) by mouth every 6 (six) hours as needed for Itching.      atorvastatin 10 MG Oral Tab Take 1 tablet (10 mg total) by mouth nightly. 90 tablet 3      Ready to quit: Not Answered  Counseling given: Not Answered  Tobacco comments: Smoked 1 - 1.5 pack daily in past. 1.5 packs weekly..       REVIEW OF SYSTEMS:   Positive Findings indicated in BOLD    Constitutional: Fever, Chills, Weight Gain, Weight Loss, Night Sweats, Fatigue, Malaise  ENT/Mouth:  Hearing Changes, Ear Pain, Nasal Congestion, Sinus Pain, Hoarseness, Sore throat, Rhinorrhea, Swallowing Difficulty  Eyes: Eye Pain, Swelling, Redness, Foreign Body, Discharge, Vision Changes  Cardiovascular: Chest Pain, SOB, PND, Dyspnea on Exertion, Orthopnea, Claudication, Edema, Palpitations  Respiratory: Cough, Sputum, Wheezing, Shortness of breath  Gastrointestinal: Nausea, Vomiting, Diarrhea, Constipation, Pain, Heartburn, Dysphagia, Bloody stools, Tarry stools  Genitourinary: Dysmenorrhea, Dysuria, Urinary Frequency, Hematuria, Urinary Incontinence, Urgency,  Flank Pain  Musculoskeletal: Arthralgias, Myalgias, Joint Swelling, Joint Stiffness, Back Pain, Neck Pain  Integumentary: Skin Lesions, Pruritis, Hair Changes, Jaundice, Nail changes  Neuro: Weakness, Numbness,  Paresthesias, Loss of Consciousness, Syncope, Dizziness, Headache, Falls  Psych: Anxiety, Depression, Insomnia, Suicidal Ideation, Homicidal ideation, Memory Changes  Heme/Lymph: Bruising, Bleeding, Lymphadenopathy  Endocrine: Polyuria, Polydipsia, Temperature Intolerance    EXAM:   Vital Signs:  Blood pressure 132/70, pulse 85, temperature 98.3 °F (36.8 °C), height 5' 11\" (1.803 m), weight 237 lb (107.5 kg), SpO2 96%.     Constitutional: No acute distress. Alert and oriented x 3.  Eyes: EOMI, PERRLA, clear sclera b/l  HENT: NCAT, Moist mucous membranes, Oropharynx without erythema or exudates  Cardiovascular: S1, S2, no S3, no S4, Regular rate and rhythm, No murmurs/gallops/rubs.   Respiratory: Clear to auscultation bilaterally.  No wheezes/rales/rhonchi  Gastrointestinal: Soft, nontender, nondistended. Positive bowel sounds x 4. No rebound tenderness. No hepatomegaly, No splenomegaly  Genitourinary: No CVA tenderness bilaterally  Neurologic: No focal neurological deficits, CN II-XII intact, light touch intact, MSK Strength 5/5 and symmetric in all extremities, normal gait, 2+ patellar tendon  Musculoskeletal: Full range of motion of all extremities, no clubbing/swelling/edema  Skin: No lesions, No erythema, no jaundice, Cap Refill < 2s  Psychiatric: Appropriate mood and affect  Heme/Lymph/Immune: No cervical LAD        DATA REVIEWED   Labs:  Recent Results (from the past 8760 hours)   Comp Metabolic Panel (14)    Collection Time: 03/04/24  3:21 PM   Result Value Ref Range    Glucose 87 70 - 99 mg/dL    Sodium 136 136 - 145 mmol/L    Potassium 4.6 3.5 - 5.1 mmol/L    Chloride 107 98 - 112 mmol/L    CO2 22.0 21.0 - 32.0 mmol/L    Anion Gap 7 0 - 18 mmol/L    BUN 14 9 - 23 mg/dL    Creatinine 0.97 0.70 - 1.30 mg/dL    BUN/CREA Ratio 14.4 10.0 - 20.0    Calcium, Total 9.6 8.7 - 10.4 mg/dL    Calculated Osmolality 282 275 - 295 mOsm/kg    eGFR-Cr 87 >=60 mL/min/1.73m2    ALT 27 10 - 49 U/L    AST 27 <=34 U/L     Alkaline Phosphatase 79 45 - 117 U/L    Bilirubin, Total 0.4 0.2 - 1.1 mg/dL    Total Protein 6.6 5.7 - 8.2 g/dL    Albumin 3.9 3.2 - 4.8 g/dL    Globulin  2.7 (L) 2.8 - 4.4 g/dL    A/G Ratio 1.4 1.0 - 2.0    Patient Fasting for CMP? No      *Note: Due to a large number of results and/or encounters for the requested time period, some results have not been displayed. A complete set of results can be found in Results Review.       Recent Results (from the past 8760 hours)   CBC W/DIFF [E]    Collection Time: 09/24/24 11:59 AM   Result Value Ref Range    WBC 14.5 (H) 4.0 - 11.0 x10(3) uL    RBC 6.01 (H) 3.80 - 5.80 x10(6)uL    HGB 17.6 (H) 13.0 - 17.5 g/dL    HCT 50.6 39.0 - 53.0 %    MCV 84.2 80.0 - 100.0 fL    MCH 29.3 26.0 - 34.0 pg    MCHC 34.8 31.0 - 37.0 g/dL    RDW-SD 42.9 35.1 - 46.3 fL    RDW 13.9 11.0 - 15.0 %    .0 150.0 - 450.0 10(3)uL    Neutrophil Absolute Prelim 9.64 (H) 1.50 - 7.70 x10 (3) uL    Neutrophil Absolute 9.64 (H) 1.50 - 7.70 x10(3) uL    Lymphocyte Absolute 3.33 1.00 - 4.00 x10(3) uL    Monocyte Absolute 0.94 0.10 - 1.00 x10(3) uL    Eosinophil Absolute 0.31 0.00 - 0.70 x10(3) uL    Basophil Absolute 0.12 0.00 - 0.20 x10(3) uL    Immature Granulocyte Absolute 0.12 0.00 - 1.00 x10(3) uL    Neutrophil % 66.8 %    Lymphocyte % 23.0 %    Monocyte % 6.5 %    Eosinophil % 2.1 %    Basophil % 0.8 %    Immature Granulocyte % 0.8 %     *Note: Due to a large number of results and/or encounters for the requested time period, some results have not been displayed. A complete set of results can be found in Results Review.       Imaging:  CT chest 1/22/2024        Impression   CONCLUSION: Smooth well-circumscribed benign morphology pulmonary nodule in the medial segment of the middle lobe measures 13 x 9 millimeter .  This measured 9 x 7 millimeter on the 1st available chest CT of August 2021; the growth rate is also  suggestive of benign/indolent process         ASSESSMENT AND PLAN:      Angioedema  ER visit at [facility] reviewed.  Was discharged on preventative treatment for shingles, advised to stop taking lisinopril due to concern for angioedema  - Started on Valtrex empirically for shingles  - Also on Keflex for possible cellulitis  - No recurrence since initial presentation  - Lisinopril discontinued, transition to losartan as below    Coronary artery disease  Severe atherosclerosis noted on CT scan of the chest as an incidental finding.  He does have family history of heart disease.  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 Notable microalbuminuria.   -  Started on Farxiga.  Plan to repeat urine studies with nephrology  - Follows with nephrology, Dr. Whatley.  Deferred renal biopsy per patient preference as proteinuria has stabilized.  - Last seen by nephrology, Dr. Whatley 10/9/2024     History of tobacco use  Lung nodule  Has successfully stopped smoking.  Notable COPD changes on CT scan, but asymptomatic.  - History of lung nodule, with last CT scan in January.  Small well-circumscribed benign morphology pulmonary nodule 13 x 9 mm.  Suggestive of benign, indolent process.  - Will plan to consider further annual screenings.     Hypertension  -Blood pressure today at goal  - Check blood pressures at home  - Continue with home amlodipine 10 mg daily, losartan 50 mg once a day with improvement of blood pressures  -PART of blood pressure checks    Hyperlipidemia  -Last lipid panel 1 year ago with overall good control  - Repeat fasting lipid panel  - Continue with atorvastatin 10 mg nightly     Pulmonary nodule  - CT Chest is stable, being monitored  by  Dr. Davis.      Diverticulosis  History of recurrent diverticulitis status post colectomy  - No recurrences, last colonoscopy 10/6/2023 with Dr. Thomas     Erythrocytosis  JAK2 mutation negative.  Likely secondary to prior tobacco use  - Advised sleep study per Dr. Cid sleep apnea    Neutrophilia  -May be related to smoking as well  - Checking for BCR-able per Dr. Cid    Vitamin D deficiency  - Last vitamin D 18.4  - Continue with vitamin D supplementation as directed by Dr. Whatley      Concern for KATHLEEN  STOP BAN  -Concern for symptoms of sleep apnea.  - We did discuss conservative measures that can improve snoring and sleep apnea:    Targeting weight loss over time  Avoidance of sedating medications or substances such as alcohol before bedtime  Can use an incline with using 2 or 3 pillows at bedtime  Can consider side sleeping  Use of an oral appliance as an alternative to CPAP.  May consider over-the-counter oral or nasal sleep appliances.  If not effective, can consider discussing with dentistry/oral surgery regarding specialized oral appliance.    -We did discuss performing a home sleep study for diagnostic sleep study.  This is the first step of working up sleep apnea.  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  There are no physical neuropsychological limitations for him to continue.  We completed the paperwork together in clinic.    Spent 30 minutes obtaining history, evaluating patient,  discussing treatment options, diet, exercise, review of available labs and radiology reports, and completing documentation.       Return to clinic in 6 months for annual physical examination    Eric Powell MD, 10/11/24, 12:42 PM

## 2025-02-25 RX ORDER — ATORVASTATIN CALCIUM 10 MG/1
10 TABLET, FILM COATED ORAL NIGHTLY
Qty: 90 TABLET | Refills: 0 | Status: SHIPPED | OUTPATIENT
Start: 2025-02-25

## 2025-02-25 NOTE — TELEPHONE ENCOUNTER
Due for appt. Has one scheduled     Refill request is for a maintenance medication and has met the criteria specified in the Ambulatory Medication Refill Standing Order for eligibility, visits, laboratory, alerts and was sent to the requested pharmacy.    Requested Prescriptions     Signed Prescriptions Disp Refills    atorvastatin 10 MG Oral Tab 90 tablet 0     Sig: TAKE 1 TABLET(10 MG) BY MOUTH EVERY NIGHT     Authorizing Provider: LACHO LOMELI     Ordering User: MASSIEL FELIPE

## 2025-03-10 NOTE — H&P
HPI:   Aly Mott is a 64 year old male who presents for a Medicare Initial Preventative Physical Exam (Welcome to Medicare- < 12 months on Medicare).    No chest pain nor shortness of breath. Overall doing well.    Bed 9:30 pm, nocturia 1-2 (on Farxiga). The time change causes     No anxiety and depression at this time. He is very happy where he is at this time.    1-2 BM per day. 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 Once a in week - red meat, tries to moderate the fries, soda x 4 per week. 1-3 glasses of wine.  Lots of water.   - Physical Activity: golf x 4-5 days a week; walking a lot; exercycle during the winter, weights to improve [employer] game. Does go to the golfing range    No topic due editable text        Fall Risk Assessment:   He has been screened for Falls and is low risk.    Cognitive Assessment:   He had a completely normal cognitive assessment - see flowsheet entries     Functional Ability/Status:   Aly Mott has a completely normal functional assessment. See flowsheet for details.      Depression Screening (PHQ-2/PHQ-9): Over the LAST 2 WEEKS   Little interest or pleasure in doing things (over the last two weeks)?: Not at all  Feeling down, depressed, or hopeless (over the last two weeks)?: Not at all  PHQ-2 SCORE: 0  Little interest or pleasure in doing things: Not at all  Feeling down, depressed, or hopeless: Not at all  PHQ-2 SCORE: 0      Advanced Directive:  He does NOT have a Living Will. [Do you have a living will?: Yes]  He does NOT have a Power of  for  Health Care. [Do you have a healthcare power of ?: Yes]    Tobacco:  He currently smokes tobacco.  Social History    Tobacco Use      Smoking status: Some Days        Years: 37        Types: Cigarettes, Cigars        Last attempt to quit: 2022        Years since quittin.0      Smokeless tobacco: Never      Tobacco comments: Smoked 1 - 1.5 pack daily in past. 1.5 packs weekly..     Tobacco Cessation Documentation (Smoking and Smokeless included):  I had an in depth therapy session with Aly Mott about his tobacco use risks and options using the USPSTF's Five A's approach:    Ask: Aly is using tobacco products.  Assess: We asked about/assessed behavioral health risk and factors affecting choice of behavior change goals/methods.  Specifically I asked about readiness to quit tobacco.  Advise: We gave clear, specific, and personalized behavior change advice, including information about personal health harms and benefits. Specifically, he was told that Quitting tobacco is one of the best things he can do for his health. I strongly encouraged him to quit.      Agree: We collaboratively selected appropriate treatment goals and methods based on the patient’s interest in and willingness to change the behavior.   Assist: We used behavior change techniques (self-help and/or counseling), to aid Aly in achieving agreed-upon goals by acquiring the skills, confidence, and social/environmental supports for behavior change, supplemented with adjunctive medical treatments when appropriate. Additionally, national quitting tobacco aides were discussed.   Arrange: Follow up at next visit- see specific follow up below.    Time Counseled: <1 minutes - not smoking at this time       CAGE Alcohol Screen:   CAGE screening score of 0 on 3/13/2025, showing low risk of alcohol abuse.       Patient Care Team: Patient Care Team:  Eric Powell MD as PCP - General (Internal Medicine)    Patient Active Problem  List   Diagnosis    Essential hypertension    Diverticulosis    Tobacco abuse    Erythrocytosis    Colonic diverticular abscess    Azotemia    Diverticulitis    History of diverticulitis    Nephrotic syndrome    Lung nodule     Wt Readings from Last 3 Encounters:   03/13/25 236 lb (107 kg)   10/11/24 237 lb (107.5 kg)   10/09/24 239 lb (108.4 kg)      Last Cholesterol Labs:   Lab Results   Component Value Date    CHOLEST 151 03/11/2025    HDL 40 03/11/2025    LDL 80 03/11/2025    TRIG 180 (H) 03/11/2025          Last Chemistry Labs:   Lab Results   Component Value Date    AST 29 03/11/2025    ALT 27 03/11/2025    CA 9.5 03/11/2025    ALB 3.9 03/11/2025    TSH 2.940 03/11/2025    CREATSERUM 1.15 03/11/2025     (H) 03/11/2025        CBC  (most recent labs)   Lab Results   Component Value Date    WBC 11.8 (H) 03/11/2025    HGB 17.6 (H) 03/11/2025    .0 (H) 03/11/2025        ALLERGIES:   He is allergic to aspirin, lisinopril, sulfa antibiotics, and hydrochlorothiazide.    CURRENT MEDICATIONS:   Outpatient Medications Marked as Taking for the 3/13/25 encounter (Office Visit) with Eric Powell MD   Medication Sig    amLODIPine 10 MG Oral Tab Take 1 tablet (10 mg total) by mouth daily.    [START ON 6/25/2025] losartan 50 MG Oral Tab Take 1 tablet (50 mg total) by mouth daily.    [START ON 5/23/2025] atorvastatin 10 MG Oral Tab Take 1 tablet (10 mg total) by mouth nightly.    FARXIGA 10 MG Oral Tab TAKE 1 TABLET(10 MG) BY MOUTH DAILY      MEDICAL INFORMATION:   He  has a past medical history of Diabetic retinopathy (HCC), Diverticulosis, Essential hypertension, Hemorrhoid, High blood pressure, High cholesterol, History of blood in urine, PONV (postoperative nausea and vomiting), Renal disorder, and Visual impairment.    He  has a past surgical history that includes stress test; ekg tracing for initial prev (01/19/2021); chest x-ray 1 vw; colonoscopy (04/14/2021); Colectomy (02/23/2022); colonoscopy; and  colonoscopy (N/A, 10/6/2023).    His family history includes CAD in his father; DEAFNESS in his mother; Dementia in his mother; Diabetes in his maternal grandfather; Heart Attack in his mother; Heart Disorder in his father; Hypertension in his father and mother; Stroke in his maternal grandfather.   SOCIAL HISTORY:   He  reports that he has been smoking cigarettes and cigars. He started smoking about 40 years ago. He has never used smokeless tobacco. He reports current alcohol use. He reports current drug use. Frequency: 7.00 times per week. Drug: Cannabis.     REVIEW OF SYSTEMS:   GENERAL: feels well otherwise  SKIN: denies any unusual skin lesions  EYES: denies blurred vision or double vision  HEENT: denies nasal congestion, sinus pain or ST  LUNGS: denies shortness of breath with exertion  CARDIOVASCULAR: denies chest pain on exertion  GI: denies abdominal pain, denies heartburn  : 0 per night nocturia, no complaint of urinary incontinence  MUSCULOSKELETAL: denies back pain  NEURO: denies headaches  PSYCHE: denies depression or anxiety  HEMATOLOGIC: denies hx of anemia  ENDOCRINE: denies thyroid history  ALL/ASTHMA: denies hx of allergy or asthma    EXAM:   /80   Pulse 76   Temp 97.9 °F (36.6 °C)   Ht 5' 11\" (1.803 m)   Wt 236 lb (107 kg)   SpO2 95%   BMI 32.92 kg/m²   Estimated body mass index is 32.92 kg/m² as calculated from the following:    Height as of this encounter: 5' 11\" (1.803 m).    Weight as of this encounter: 236 lb (107 kg).    Medicare Hearing Assessment  (Required for AWV/SWV)    Hearing Screening    Screening Method: Whisper Test  Whisper Test Result: Pass                  Visual Acuity  Right Eye Visual Acuity: Corrected Right Eye Chart Acuity: 20/20   Left Eye Visual Acuity: Corrected Left Eye Chart Acuity: 20/20   Both Eyes Visual Acuity: Corrected Both Eyes Chart Acuity: 20/20   Able To Tolerate Visual Acuity: Yes      General Appearance:  Alert, cooperative, no distress,  appears stated age   Head:  Normocephalic, without obvious abnormality, atraumatic   Eyes:  PERRL, conjunctiva/corneas clear, EOM's intact, both eyes   Ears:  Normal TM's and external ear canals, both ears   Nose: Nares normal, septum midline, mucosa normal, no drainage or sinus tenderness   Throat: Lips, mucosa, and tongue normal; teeth and gums normal   Neck: Supple, symmetrical, trachea midline, no adenopathy, thyroid: not enlarged, symmetric, no tenderness/mass/nodules, no carotid bruit or JVD   Back:   Symmetric, no curvature, ROM normal, no CVA tenderness   Lungs:   Clear to auscultation bilaterally, respirations unlabored   Chest Wall:  No tenderness or deformity   Heart:  Regular rate and rhythm, S1, S2 normal, no murmur, rub or gallop   Abdomen:   Soft, non-tender, bowel sounds active all four quadrants,  no masses, no organomegaly   Genitalia: Deferred   Rectal: Deferred   Extremities: Extremities normal, atraumatic, no cyanosis or edema   Pulses: 2+ and symmetric   Skin: Skin color, texture, turgor normal, no rashes or lesions   Lymph nodes: Cervical, supraclavicular, and axillary nodes normal   Neurologic: Normal, CN II through XII intact, 5 out of 5 muscle strength throughout, 2+ DTRs patellar tendons, normal gait          Vaccination History     Immunization History   Administered Date(s) Administered    >= 3 Yrs, FLUZONE, Pres Free (99060), Influenza Vaccine, Flu [facility] 09/12/2022    Covid-19 Vaccine Pfizer 30 mcg/0.3 ml 03/06/2021, 03/31/2021, 10/18/2021, 09/22/2022    Covid-19 Vaccine Pfizer Bivalent 30mcg/0.3mL 10/31/2022    Covid-19 Vaccine Pfizer Mega-Sucrose 30 mcg/0.3 ml 05/28/2022    FLU VAC High Dose 65 YRS & Older PRSV Free (06890) 10/20/2023    FLUZONE 6 months and older PFS 0.5 ml (70783) 10/18/2021    Flucelvax Influenza vaccine, trivalent (ccIIV3), 0.5mL IM 09/28/2022    Influenza 10/20/2023, 10/26/2024    Pfizer Covid-19 Vaccine 30mcg/0.3ml 12yrs+ 10/20/2023    Pneumococcal Conjugate  PCV20 03/01/2023    TDAP 02/14/2020    Zoster Vaccine Recombinant Adjuvanted (Shingrix) 03/08/2023, 05/25/2023        ASSESSMENT AND OTHER RELEVANT CHRONIC CONDITIONS:   Aly Mott is a 64 year old male who presents for a Medicare Assessment.     Imaging:  CT chest 1/22/2024        Impression   CONCLUSION: Smooth well-circumscribed benign morphology pulmonary nodule in the medial segment of the middle lobe measures 13 x 9 millimeter .  This measured 9 x 7 millimeter on the 1st available chest CT of August 2021; the growth rate is also  suggestive of benign/indolent process       PLAN SUMMARY:   Diagnoses and all orders for this visit:    Annual physical exam    Erythrocytosis    Lung nodule    Coronary artery disease involving native coronary artery of native heart with angina pectoris  -     CARD NUC STRESS TEST LEXISCAN (CPT 10469/01394/); Future    Essential hypertension    Screening for prostate cancer    Screening for thyroid disorder    Screening for deficiency anemia    Screening for diabetes mellitus    Coronary artery calcification  -     CARD NUC STRESS TEST LEXISCAN (CPT 43065/60072/); Future    Encounter for annual health examination    Other orders  -     amLODIPine 10 MG Oral Tab; Take 1 tablet (10 mg total) by mouth daily.  -     losartan 50 MG Oral Tab; Take 1 tablet (50 mg total) by mouth daily.  -     atorvastatin 10 MG Oral Tab; Take 1 tablet (10 mg total) by mouth nightly.         Angioedema  ER visit at [facility] reviewed.  Was discharged on preventative treatment for shingles, advised to stop taking lisinopril due to concern for angioedema  - Started on Valtrex empirically for shingles  - Also on Keflex for possible cellulitis  - No recurrence since initial presentation  - Lisinopril discontinued, transition to losartan as below  -No recurrence while on losartan     Coronary artery disease  Severe atherosclerosis noted on CT scan of the chest as an  incidental finding.  He does have family history of heart disease.  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  Reordered on this visit     Nephrotic syndrome  Stage I chronic kidney disease. Notable microalbuminuria.   -  Started on Farxiga.  Plan to repeat urine studies with nephrology  - Follows with nephrology, Dr. Whatley.  Deferred renal biopsy per patient preference as proteinuria has stabilized.  - Last seen by nephrology, Dr. Whatley 10/9/2024     History of tobacco use  Lung nodule  Has successfully stopped smoking.  Notable COPD changes on CT scan, but asymptomatic.  - History of lung nodule, with last CT scan in January.  Small well-circumscribed benign morphology pulmonary nodule 13 x 9 mm.  Suggestive of benign, indolent process.  - Will plan to consider further annual screenings.     Hypertension  -Blood pressure today at goal  - Check blood pressures at home  - Continue with home amlodipine 10 mg daily, losartan 50 mg once a day with improvement of blood pressures  -PART of blood pressure checks     Hyperlipidemia  -Last lipid panel 1 year ago with overall good control  - Repeat fasting lipid panel  - Continue with atorvastatin 10 mg nightly     Pulmonary nodule  - CT Chest is stable, being monitored  by Dr. Davis.      Diverticulosis  History of recurrent diverticulitis status post colectomy  - No recurrences, last colonoscopy 10/6/2023 with Dr. Thomas     Erythrocytosis  JAK2 mutation negative.  Likely secondary to prior tobacco use  - Advised sleep study per Dr. Cid sleep apnea     Neutrophilia  -May be related to smoking as well  - Checking for BCR-able per Dr. Cid     Vitamin D deficiency  - Last vitamin D 18.4  - Continue  with vitamin D supplementation as directed by Dr. Whatley        Concern for KATHLEEN  STOP BAN  -Concern for symptoms of sleep apnea.  - We did discuss conservative measures that can improve snoring and sleep apnea:     Targeting weight loss over time  Avoidance of sedating medications or substances such as alcohol before bedtime  Can use an incline with using 2 or 3 pillows at bedtime  Can consider side sleeping  Use of an oral appliance as an alternative to CPAP.  May consider over-the-counter oral or nasal sleep appliances.  If not effective, can consider discussing with dentistry/oral surgery regarding specialized oral appliance.     - He does have a sleep study ordered       HTN Screen: BP at goal  DM Screen: Check A1c/fasting sugar  HLD Screen: Check fasting lipid panel  HCV Screen: Considered low risk  HIV Screen: considered low risk  G/C/Syphilis: Considered low risk     Colon Cancer Screening (45-70): Last colonoscopy 10/6/2023 with Dr. Thomas Due in 10 years   Prostate Cancer Screening: (50-70): Check PSA  Lung Cancer Screening (55-79 with 30 p/year and active < 15 years): Up-to-date  AAA Screening (65-75 Hx of smoking): Due for AAA ultrasound     Influenza: Annually   Td/Tdap: Last Tdap , due   Zoster (50+): Up-to-date  HPV (19-26): Not indicated  Pneumococcal: Up-to-date       Immunization History   Administered Date(s) Administered    >= 3 Yrs, FLUZONE, Pres Free (38996), Influenza Vaccine, Flu [facility] 2022    Covid-19 Vaccine Pfizer 30 mcg/0.3 ml 2021, 2021, 10/18/2021, 2022    Covid-19 Vaccine Pfizer Bivalent 30mcg/0.3mL 10/31/2022    Covid-19 Vaccine Pfizer Mega-Sucrose 30 mcg/0.3 ml 2022    FLU VAC High Dose 65 YRS & Older PRSV Free (00098) 10/20/2023    FLUZONE 6 months and older PFS 0.5 ml (67635) 10/18/2021    Flucelvax Influenza vaccine, trivalent (ccIIV3), 0.5mL IM 2022    Influenza 10/20/2023, 10/26/2024    Pfizer Covid-19 Vaccine 30mcg/0.3ml  12yrs+ 10/20/2023    Pneumococcal Conjugate PCV20 03/01/2023    TDAP 02/14/2020    Zoster Vaccine Recombinant Adjuvanted (Shingrix) 03/08/2023, 05/25/2023         Diet assessment: good     PLAN:  The patient indicates understanding of these issues and agrees to the plan.  Reinforced healthy diet, lifestyle, and exercise.    Return in about 6 months (around 9/13/2025) for Follow-up.       General Health     In the past six months, have you lost more than 10 pounds without trying?: 2 - No  Has your appetite been poor?: No  How does the patient maintain a good energy level?: Daily Walks, Other  How would you describe your daily physical activity?: Moderate  How would you describe your current health state?: Good  How do you maintain positive mental well-being?: Social Interaction, Visiting Family     Supplementary Documentation:   Aly Mott's SCREENING SCHEDULE   Tests on this list are recommended by your physician but may not be covered, or covered at this frequency, by your insurer.   Please check with your insurance carrier before scheduling to verify coverage.   PREVENTATIVE SERVICES FREQUENCY &  COVERAGE DETAILS LAST COMPLETION DATE   Diabetes Screening    Fasting Blood Sugar / Glucose    One screening every 12 months if never tested or if previously tested but not diagnosed with pre-diabetes   One screening every 6 months if diagnosed with pre-diabetes Lab Results   Component Value Date     (H) 03/11/2025        Cardiovascular Disease Screening    Lipid Panel  Cholesterol  Lipoprotein (HDL)  Triglycerides Covered every 5 years for all Medicare beneficiaries without apparent signs or symptoms of cardiovascular disease Lab Results   Component Value Date    CHOLEST 151 03/11/2025    HDL 40 03/11/2025    LDL 80 03/11/2025    TRIG 180 (H) 03/11/2025         Electrocardiogram (EKG)   Covered if needed at Welcome to Medicare, and non-screening if indicated for medical reasons 02/15/2022       Ultrasound Screening for Abdominal Aortic Aneurysm (AAA) Covered once in a lifetime for one of the following risk factors    Men who are 65-75 years old and have ever smoked    Anyone with a family history -     Colorectal Cancer Screening  Covered for ages 50-85; only need ONE of the following:    Colonoscopy   Covered every 10 years    Covered every 2 years if patient is at high risk or previous colonoscopy was abnormal 10/06/2023    Health Maintenance   Topic Date Due    Colorectal Cancer Screening  10/06/2033       Flexible Sigmoidoscopy   Covered every 4 years 02/22/2022    Fecal Occult Blood Test Covered annually -   Prostate Cancer Screening    Prostate-Specific Antigen (PSA) Annually No results found for: \"PSA\"  Health Maintenance   Topic Date Due    PSA  03/11/2027      Immunizations    Influenza Covered once per flu season  Please get every year 10/26/2024  No recommendations at this time    Pneumococcal Each vaccine ([id number] & [id number]) covered once after 65 Prevnar 13: -    [id number]: -     No recommendations at this time    Hepatitis B One screening covered for patients with certain risk factors   -  No recommendations at this time    Tetanus Toxoid Not covered by Medicare Part B unless medically necessary (cut with metal); may be covered with your pharmacy prescription benefits -    Tetanus, Diptheria and Pertusis TD and TDaP Not covered by Medicare Part B -  No recommendations at this time    Zoster Not covered by Medicare Part B; may be covered with your pharmacy  prescription benefits -  No recommendations at this time      Annual Monitoring of Persistent Medications (ACE/ARB, digoxin diuretics, anticonvulsants)    Potassium Annually Lab Results   Component Value Date    K 4.5 03/11/2025         Creatinine   Annually Lab Results   Component Value Date    CREATSERUM 1.15 03/11/2025         BUN Annually Lab Results   Component Value Date    BUN 17 03/11/2025       Drug Serum Conc Annually No  results found for: \"DIGOXIN\", \"DIG\", \"VALP\"              Spent 30 minutes obtaining history, evaluating patient, discussing treatment options, diet, exercise, review of available labs and radiology reports, and completing documentation.

## 2025-03-10 NOTE — PATIENT INSTRUCTIONS
- You were seen in clinic for regular annual check-up.  We reviewed your most recent set of blood work with overall good control of cholesterol, sugar levels, kidney function.  Lets continue the same medications for now, increase physical activity as it is getting warmer out      -Given your family history and calcifications of the coronary arteries, let's proceed with a stress test within the next 3 months. This will check for adequate blood flow throughout the heart    In the meantime, lets keep up with good cardiovascular exercise  Will try to control your cholesterol and blood pressure forward to reduce risk of progression of heart disease.    Blood pressure seems better controlled with amlodipine and losartan  - Okay to spread out blood pressure checks as they have been at goal    -Concern for symptoms of sleep apnea.  Where we can obtain a home sleep study to get the diagnosis and severity  - We did discuss conservative measures that can improve snoring and sleep apnea: Will await the results of the sleep study-Congratulations on stopping smoking as this will reduce your risk for negative effects such as lung cancer, progressive lung disease, accelerated heart disease.  I think you have good control of this and lets sustain complete cessation for now  - The lung nodule seen on your last CT scan is likely benign, we will consider repeating in 1 year as directed by hematology    - Continue following with nephrology, Dr. Whatley for surveillance of your nephrotic syndrome.  Likely, this has remained stable  -Please continue checking your blood pressures at home and notify us if they are increasing  -You are up-to-date on all your major vaccines  - Please continue to eat a varied diet including recommended servings of vegetables, fruits, and low fat dairy. Minimize high saturated fats (such as fast foods) and high sugar intake (such as soda)  - We recommend 150 minutes of moderate intensity exercise (brisk walking,  swimming) weekly to maintain your current weight.  Targeted weight loss will require more vigorous exercise or more than 150 minutes/week.        Return to clinic in 6 months for follow-up

## 2025-03-11 ENCOUNTER — LAB ENCOUNTER (OUTPATIENT)
Dept: LAB | Age: 65
End: 2025-03-11
Attending: INTERNAL MEDICINE
Payer: MEDICARE

## 2025-03-11 DIAGNOSIS — D75.1 ERYTHROCYTOSIS: ICD-10-CM

## 2025-03-11 DIAGNOSIS — Z13.220 SCREENING FOR LIPOID DISORDERS: ICD-10-CM

## 2025-03-11 DIAGNOSIS — N04.9 NEPHROTIC SYNDROME: ICD-10-CM

## 2025-03-11 DIAGNOSIS — Z13.29 SCREENING FOR THYROID DISORDER: ICD-10-CM

## 2025-03-11 DIAGNOSIS — Z12.5 SCREENING FOR PROSTATE CANCER: ICD-10-CM

## 2025-03-11 DIAGNOSIS — Z13.0 SCREENING FOR DEFICIENCY ANEMIA: ICD-10-CM

## 2025-03-11 LAB
ALBUMIN SERPL-MCNC: 3.9 G/DL (ref 3.2–4.8)
ALBUMIN/GLOB SERPL: 1.5 {RATIO} (ref 1–2)
ALP LIVER SERPL-CCNC: 73 U/L
ALT SERPL-CCNC: 27 U/L
ANION GAP SERPL CALC-SCNC: 5 MMOL/L (ref 0–18)
AST SERPL-CCNC: 29 U/L (ref ?–34)
BASOPHILS # BLD AUTO: 0.09 X10(3) UL (ref 0–0.2)
BASOPHILS NFR BLD AUTO: 0.8 %
BILIRUB SERPL-MCNC: 0.5 MG/DL (ref 0.2–1.1)
BUN BLD-MCNC: 17 MG/DL (ref 9–23)
CALCIUM BLD-MCNC: 9.5 MG/DL (ref 8.7–10.6)
CHLORIDE SERPL-SCNC: 104 MMOL/L (ref 98–112)
CHOLEST SERPL-MCNC: 151 MG/DL (ref ?–200)
CO2 SERPL-SCNC: 28 MMOL/L (ref 21–32)
COMPLEXED PSA SERPL-MCNC: 1 NG/ML (ref ?–4)
CREAT BLD-MCNC: 1.15 MG/DL
EGFRCR SERPLBLD CKD-EPI 2021: 70 ML/MIN/1.73M2 (ref 60–?)
EOSINOPHIL # BLD AUTO: 0.23 X10(3) UL (ref 0–0.7)
EOSINOPHIL NFR BLD AUTO: 2 %
ERYTHROCYTE [DISTWIDTH] IN BLOOD BY AUTOMATED COUNT: 14.3 %
FASTING PATIENT LIPID ANSWER: YES
FASTING STATUS PATIENT QL REPORTED: YES
GLOBULIN PLAS-MCNC: 2.6 G/DL (ref 2–3.5)
GLUCOSE BLD-MCNC: 100 MG/DL (ref 70–99)
HCT VFR BLD AUTO: 51.9 %
HDLC SERPL-MCNC: 40 MG/DL (ref 40–59)
HGB BLD-MCNC: 17.6 G/DL
IMM GRANULOCYTES # BLD AUTO: 0.11 X10(3) UL (ref 0–1)
IMM GRANULOCYTES NFR BLD: 0.9 %
LDLC SERPL CALC-MCNC: 80 MG/DL (ref ?–100)
LYMPHOCYTES # BLD AUTO: 2.69 X10(3) UL (ref 1–4)
LYMPHOCYTES NFR BLD AUTO: 22.9 %
MCH RBC QN AUTO: 29.5 PG (ref 26–34)
MCHC RBC AUTO-ENTMCNC: 33.9 G/DL (ref 31–37)
MCV RBC AUTO: 87.1 FL
MONOCYTES # BLD AUTO: 0.94 X10(3) UL (ref 0.1–1)
MONOCYTES NFR BLD AUTO: 8 %
NEUTROPHILS # BLD AUTO: 7.71 X10 (3) UL (ref 1.5–7.7)
NEUTROPHILS # BLD AUTO: 7.71 X10(3) UL (ref 1.5–7.7)
NEUTROPHILS NFR BLD AUTO: 65.4 %
NONHDLC SERPL-MCNC: 111 MG/DL (ref ?–130)
OSMOLALITY SERPL CALC.SUM OF ELEC: 286 MOSM/KG (ref 275–295)
PLATELET # BLD AUTO: 464 10(3)UL (ref 150–450)
POTASSIUM SERPL-SCNC: 4.5 MMOL/L (ref 3.5–5.1)
PROT SERPL-MCNC: 6.5 G/DL (ref 5.7–8.2)
RBC # BLD AUTO: 5.96 X10(6)UL
SODIUM SERPL-SCNC: 137 MMOL/L (ref 136–145)
TRIGL SERPL-MCNC: 180 MG/DL (ref 30–149)
TSI SER-ACNC: 2.94 UIU/ML (ref 0.55–4.78)
VLDLC SERPL CALC-MCNC: 28 MG/DL (ref 0–30)
WBC # BLD AUTO: 11.8 X10(3) UL (ref 4–11)

## 2025-03-11 PROCEDURE — 85025 COMPLETE CBC W/AUTO DIFF WBC: CPT

## 2025-03-11 PROCEDURE — 80061 LIPID PANEL: CPT

## 2025-03-11 PROCEDURE — 80053 COMPREHEN METABOLIC PANEL: CPT

## 2025-03-11 PROCEDURE — 36415 COLL VENOUS BLD VENIPUNCTURE: CPT

## 2025-03-11 PROCEDURE — 84443 ASSAY THYROID STIM HORMONE: CPT

## 2025-03-13 ENCOUNTER — OFFICE VISIT (OUTPATIENT)
Dept: INTERNAL MEDICINE CLINIC | Facility: CLINIC | Age: 65
End: 2025-03-13

## 2025-03-13 VITALS
HEART RATE: 76 BPM | HEIGHT: 71 IN | OXYGEN SATURATION: 95 % | TEMPERATURE: 98 F | WEIGHT: 236 LBS | SYSTOLIC BLOOD PRESSURE: 128 MMHG | DIASTOLIC BLOOD PRESSURE: 80 MMHG | BODY MASS INDEX: 33.04 KG/M2

## 2025-03-13 DIAGNOSIS — R91.1 LUNG NODULE: ICD-10-CM

## 2025-03-13 DIAGNOSIS — I25.10 CORONARY ARTERY CALCIFICATION: ICD-10-CM

## 2025-03-13 DIAGNOSIS — Z13.1 SCREENING FOR DIABETES MELLITUS: ICD-10-CM

## 2025-03-13 DIAGNOSIS — Z13.0 SCREENING FOR DEFICIENCY ANEMIA: ICD-10-CM

## 2025-03-13 DIAGNOSIS — I10 ESSENTIAL HYPERTENSION: ICD-10-CM

## 2025-03-13 DIAGNOSIS — Z00.00 ANNUAL PHYSICAL EXAM: Primary | ICD-10-CM

## 2025-03-13 DIAGNOSIS — I25.119 CORONARY ARTERY DISEASE INVOLVING NATIVE CORONARY ARTERY OF NATIVE HEART WITH ANGINA PECTORIS: ICD-10-CM

## 2025-03-13 DIAGNOSIS — Z12.5 SCREENING FOR PROSTATE CANCER: ICD-10-CM

## 2025-03-13 DIAGNOSIS — D75.1 ERYTHROCYTOSIS: ICD-10-CM

## 2025-03-13 DIAGNOSIS — Z00.00 ENCOUNTER FOR ANNUAL HEALTH EXAMINATION: ICD-10-CM

## 2025-03-13 DIAGNOSIS — Z13.29 SCREENING FOR THYROID DISORDER: ICD-10-CM

## 2025-03-13 RX ORDER — LOSARTAN POTASSIUM 50 MG/1
50 TABLET ORAL DAILY
Qty: 90 TABLET | Refills: 3 | Status: SHIPPED | OUTPATIENT
Start: 2025-06-25

## 2025-03-13 RX ORDER — AMLODIPINE BESYLATE 10 MG/1
10 TABLET ORAL DAILY
Qty: 90 TABLET | Refills: 3 | Status: SHIPPED | OUTPATIENT
Start: 2025-03-13

## 2025-03-13 RX ORDER — ATORVASTATIN CALCIUM 10 MG/1
10 TABLET, FILM COATED ORAL NIGHTLY
Qty: 90 TABLET | Refills: 3 | Status: SHIPPED | OUTPATIENT
Start: 2025-05-23

## 2025-03-14 ENCOUNTER — PATIENT MESSAGE (OUTPATIENT)
Facility: CLINIC | Age: 65
End: 2025-03-14

## 2025-03-17 RX ORDER — DAPAGLIFLOZIN 10 MG/1
10 TABLET, FILM COATED ORAL DAILY
Qty: 30 TABLET | Refills: 3 | Status: SHIPPED | OUTPATIENT
Start: 2025-03-17 | End: 2025-06-15

## 2025-03-26 ENCOUNTER — TELEPHONE (OUTPATIENT)
Dept: SLEEP CENTER | Age: 65
End: 2025-03-26

## 2025-04-01 ENCOUNTER — TELEPHONE (OUTPATIENT)
Dept: NEPHROLOGY | Facility: CLINIC | Age: 65
End: 2025-04-01

## 2025-04-01 ENCOUNTER — TELEPHONE (OUTPATIENT)
Dept: SLEEP CENTER | Age: 65
End: 2025-04-01

## 2025-04-01 NOTE — TELEPHONE ENCOUNTER
Patient states that he is unable to make 4/17/25 follow up appointment.  He wants to know if he should still get labs drawn and discuss results by phone after.  Patient declined to cancel the follow up and declined to schedule first available for August.  Please call

## 2025-04-01 NOTE — TELEPHONE ENCOUNTER
Dr: Phylicia please see note below.   Patient like for you to know if is ok to do he's lab work and schedule appointment for August/October depend how the lab work looks like if you think he needs to be here before he will try he's best.    Thank you.

## 2025-04-02 NOTE — TELEPHONE ENCOUNTER
That's fine.  He can get the labs 2 weeks before when his April appt was and we will call him about it.    Please tell him to go ahead and make Sept or Oct appt. Pls explain we had one doctor who left and appts are packed up

## 2025-04-03 NOTE — TELEPHONE ENCOUNTER
Informed patient of the note below  and last name verified ,verbalized understanding and scheduled.

## 2025-04-14 ENCOUNTER — APPOINTMENT (OUTPATIENT)
Age: 65
End: 2025-04-14
Attending: SPECIALIST
Payer: MEDICARE

## 2025-04-21 ENCOUNTER — APPOINTMENT (OUTPATIENT)
Age: 65
End: 2025-04-21
Attending: SPECIALIST
Payer: MEDICARE

## 2025-05-05 ENCOUNTER — LAB ENCOUNTER (OUTPATIENT)
Dept: LAB | Age: 65
End: 2025-05-05
Attending: INTERNAL MEDICINE
Payer: MEDICARE

## 2025-05-05 DIAGNOSIS — R80.9 PROTEINURIA, UNSPECIFIED TYPE: ICD-10-CM

## 2025-05-05 LAB
ALBUMIN SERPL-MCNC: 4.2 G/DL (ref 3.2–4.8)
ANION GAP SERPL CALC-SCNC: 8 MMOL/L (ref 0–18)
BILIRUB UR QL STRIP.AUTO: NEGATIVE
BUN BLD-MCNC: 15 MG/DL (ref 9–23)
CALCIUM BLD-MCNC: 9.8 MG/DL (ref 8.7–10.6)
CHLORIDE SERPL-SCNC: 101 MMOL/L (ref 98–112)
CLARITY UR REFRACT.AUTO: CLEAR
CO2 SERPL-SCNC: 28 MMOL/L (ref 21–32)
COLOR UR AUTO: COLORLESS
CREAT BLD-MCNC: 1.03 MG/DL (ref 0.7–1.3)
CREAT UR-SCNC: 16.4 MG/DL
EGFRCR SERPLBLD CKD-EPI 2021: 80 ML/MIN/1.73M2 (ref 60–?)
GLUCOSE BLD-MCNC: 94 MG/DL (ref 70–99)
GLUCOSE UR STRIP.AUTO-MCNC: 300 MG/DL
KETONES UR STRIP.AUTO-MCNC: NEGATIVE MG/DL
LEUKOCYTE ESTERASE UR QL STRIP.AUTO: NEGATIVE
MICROALBUMIN UR-MCNC: 96.9 MG/DL
MICROALBUMIN/CREAT 24H UR-RTO: 5908.5 UG/MG (ref ?–30)
NITRITE UR QL STRIP.AUTO: NEGATIVE
OSMOLALITY SERPL CALC.SUM OF ELEC: 285 MOSM/KG (ref 275–295)
PH UR STRIP.AUTO: 6.5 [PH] (ref 5–8)
PHOSPHATE SERPL-MCNC: 4.3 MG/DL (ref 2.4–5.1)
POTASSIUM SERPL-SCNC: 5.3 MMOL/L (ref 3.5–5.1)
PROT UR STRIP.AUTO-MCNC: 100 MG/DL
SODIUM SERPL-SCNC: 137 MMOL/L (ref 136–145)
SP GR UR STRIP.AUTO: <1.005 (ref 1–1.03)
UROBILINOGEN UR STRIP.AUTO-MCNC: NORMAL MG/DL
VIT D+METAB SERPL-MCNC: 31.4 NG/ML (ref 30–100)

## 2025-05-05 PROCEDURE — 82570 ASSAY OF URINE CREATININE: CPT

## 2025-05-05 PROCEDURE — 81001 URINALYSIS AUTO W/SCOPE: CPT

## 2025-05-05 PROCEDURE — 36415 COLL VENOUS BLD VENIPUNCTURE: CPT

## 2025-05-05 PROCEDURE — 82043 UR ALBUMIN QUANTITATIVE: CPT

## 2025-05-05 PROCEDURE — 80069 RENAL FUNCTION PANEL: CPT

## 2025-05-05 PROCEDURE — 82306 VITAMIN D 25 HYDROXY: CPT

## 2025-05-06 ENCOUNTER — TELEPHONE (OUTPATIENT)
Dept: NEPHROLOGY | Facility: CLINIC | Age: 65
End: 2025-05-06

## 2025-05-06 DIAGNOSIS — E55.9 VITAMIN D INSUFFICIENCY: ICD-10-CM

## 2025-05-06 DIAGNOSIS — N18.2 STAGE 2 CHRONIC KIDNEY DISEASE: Primary | ICD-10-CM

## 2025-05-06 NOTE — TELEPHONE ENCOUNTER
----- Message from ALMA WHATLEY sent at 5/6/2025  8:43 AM CDT -----  Please let Bill know that his urine protein went down which is a good thing.  When we saw each other last in October it was up to 9 g.  It is a down to 5.9 g.  His vitamin D level is perfect now.    Continue the supplemental vitamin D.    He is good to go to wait until September to see me.  I will order labs for then    ----- Message -----  From: Lab, Background User  Sent: 5/5/2025   4:57 PM CDT  To: Alma Whatley MD

## 2025-05-19 NOTE — TELEPHONE ENCOUNTER
Wbc elevated on labs. Ct results pending. Mild tenderness on exam and reported low abd pain c/w h/o diverticulitis.     Plan to start:  Ciprofloxacin (500 mg every 12 hours) plus metronidazole (500 mg every 8 hours) x 7 days    Avoid etoh during antibioti No

## 2025-06-09 ENCOUNTER — PATIENT MESSAGE (OUTPATIENT)
Dept: INTERNAL MEDICINE CLINIC | Facility: CLINIC | Age: 65
End: 2025-06-09

## 2025-06-10 ENCOUNTER — MED REC SCAN ONLY (OUTPATIENT)
Dept: INTERNAL MEDICINE CLINIC | Facility: CLINIC | Age: 65
End: 2025-06-10

## 2025-06-10 NOTE — TELEPHONE ENCOUNTER
[employer] message sent, to , advised the patient to come in between patients around 11 AM or 12 PM and I can complete the paperwork for him

## 2025-06-10 NOTE — TELEPHONE ENCOUNTER
Discussed with Radha, completed while patient was in the waiting area.  Lets keep a record for ourselves

## 2025-06-17 ENCOUNTER — APPOINTMENT (OUTPATIENT)
Age: 65
End: 2025-06-17
Attending: SPECIALIST
Payer: MEDICARE

## 2025-06-18 ENCOUNTER — APPOINTMENT (OUTPATIENT)
Age: 65
End: 2025-06-18
Attending: SPECIALIST
Payer: MEDICARE

## 2025-06-23 ENCOUNTER — APPOINTMENT (OUTPATIENT)
Age: 65
End: 2025-06-23
Attending: SPECIALIST
Payer: MEDICARE

## 2025-06-30 RX ORDER — ATORVASTATIN CALCIUM 10 MG/1
10 TABLET, FILM COATED ORAL NIGHTLY
Qty: 90 TABLET | Refills: 1 | Status: SHIPPED | OUTPATIENT
Start: 2025-06-30

## 2025-06-30 NOTE — TELEPHONE ENCOUNTER
Refill request is for a maintenance medication and has met the criteria specified in the Ambulatory Medication Refill Standing Order for eligibility, visits, laboratory, alerts and was sent to the requested pharmacy.    Requested Prescriptions     Pending Prescriptions Disp Refills    atorvastatin 10 MG Oral Tab [Pharmacy Med Name: ATORVASTATIN 10MG TABLETS] 90 tablet 1     Sig: TAKE 1 TABLET(10 MG) BY MOUTH EVERY NIGHT

## 2025-07-18 RX ORDER — DAPAGLIFLOZIN 10 MG/1
10 TABLET, FILM COATED ORAL DAILY
Qty: 90 TABLET | Refills: 0 | Status: SHIPPED | OUTPATIENT
Start: 2025-07-18

## 2025-07-18 NOTE — TELEPHONE ENCOUNTER
Last office visit:  10/9/2024    Return to office: 6  months     Follow up appointment:  9/11/2025

## 2025-07-18 NOTE — TELEPHONE ENCOUNTER
Patient calling states needs new script since no more refills. States needs for it to be 90 days instead of 30 days.  Please call.

## (undated) DIAGNOSIS — R82.90 ABNORMAL URINALYSIS: ICD-10-CM

## (undated) DIAGNOSIS — Z02.9 ENCOUNTERS FOR UNSPECIFIED ADMINISTRATIVE PURPOSE: ICD-10-CM

## (undated) DIAGNOSIS — N04.9 NEPHROTIC SYNDROME: Primary | ICD-10-CM

## (undated) DIAGNOSIS — D75.1 ERYTHROCYTOSIS: Primary | ICD-10-CM

## (undated) DIAGNOSIS — K57.32 DIVERTICULITIS OF COLON: ICD-10-CM

## (undated) DIAGNOSIS — R80.9 PROTEINURIA, UNSPECIFIED TYPE: Primary | ICD-10-CM

## (undated) DEVICE — Device: Brand: JELCO

## (undated) DEVICE — DISPOSABLE SUCTION/IRRIGATOR TUBE SET: Brand: AHTO

## (undated) DEVICE — TROCAR: Brand: KII FIOS FIRST ENTRY

## (undated) DEVICE — ENSEAL X1 TISSUE SEALER, CURVED JAW, 45 CM SHAFT LENGTH: Brand: ENSEAL

## (undated) DEVICE — Device: Brand: DISPOSABLE BULB & BLADDER

## (undated) DEVICE — ENDOPATH ECHELON ENDOSCOPIC LINEAR CUTTER RELOADS, BLUE, 60MM: Brand: ECHELON ENDOPATH

## (undated) DEVICE — UNDYED BRAIDED (POLYGLACTIN 910), SYNTHETIC ABSORBABLE SUTURE: Brand: COATED VICRYL

## (undated) DEVICE — BETADINE SOL 32 OZ 10% POVIDON

## (undated) DEVICE — ENCORE® LATEX MICRO SIZE 8, STERILE LATEX POWDER-FREE SURGICAL GLOVE: Brand: ENCORE

## (undated) DEVICE — [HIGH FLOW INSUFFLATOR,  DO NOT USE IF PACKAGE IS DAMAGED,  KEEP DRY,  KEEP AWAY FROM SUNLIGHT,  PROTECT FROM HEAT AND RADIOACTIVE SOURCES.]: Brand: PNEUMOSURE

## (undated) DEVICE — VIOLET BRAIDED (POLYGLACTIN 910), SYNTHETIC ABSORBABLE SUTURE: Brand: COATED VICRYL

## (undated) DEVICE — PAD ALC 43.5X24IN PREP  LF

## (undated) DEVICE — SUTURE PDS II 0 CT-1

## (undated) DEVICE — Device

## (undated) DEVICE — STAPLER CIRCULAR ENDO 29MM

## (undated) DEVICE — SUTURE PDS II 2-0 CT-2

## (undated) DEVICE — NEEDLE HPO 18GA 1.5IN ECLPS

## (undated) DEVICE — SYSTEM SURGYPAD VELCRO 36IN

## (undated) DEVICE — ACCESS PLATFORM FOR MINIMALLY INVASIVE SURGERY.: Brand: GELPORT® LAPAROSCOPIC  SYSTEM

## (undated) DEVICE — LEGGINGS SRG 48X31IN CUF STRL

## (undated) DEVICE — PLUMEPORT ACTIV LAPAROSCOPIC SMOKE FILTRATION DEVICE: Brand: PLUMEPORT ACTIVE

## (undated) DEVICE — 12 ML SYRINGE LUER-LOCK TIP: Brand: MONOJECT

## (undated) DEVICE — 3M™ IOBAN™ 2 ANTIMICROBIAL INCISE DRAPE 6640EZ: Brand: IOBAN™ 2

## (undated) DEVICE — SOL  .9 1000ML BTL

## (undated) DEVICE — MEDI-VAC NON-CONDUCTIVE SUCTION TUBING: Brand: CARDINAL HEALTH

## (undated) DEVICE — TROCAR: Brand: KII® SLEEVE

## (undated) DEVICE — ELECTRO LUBE IS A SINGLE PATIENT USE DEVICE THAT IS INTENDED TO BE USED ON ELECTROSURGICAL ELECTRODES TO REDUCE STICKING.: Brand: KEY SURGICAL ELECTRO LUBE

## (undated) DEVICE — PURSE STRING DEVICE: Brand: PURSTRING

## (undated) DEVICE — SOLUTION ENDOSCOPIC ANTI-FOG NON-TOXIC NON-ABRASIVE 6 CUBIC CENTIMETER WITH RADIOPAQUE ADHESIVE-BACKED SPONGE STERILE NOT MADE WITH NATURAL RUBBER LATEX MEDICHOICE: Brand: MEDICHOICE

## (undated) DEVICE — SUTURE CHROMIC GUT 2-0 SH

## (undated) DEVICE — ECHELON FLEX POWERED PLUS ARTICULATING ENDOSCOPIC LINEAR CUTTER , 60MM: Brand: ECHELON FLEX

## (undated) DEVICE — SOL  .9 3000ML

## (undated) DEVICE — A P RESECTION: Brand: MEDLINE INDUSTRIES, INC.

## (undated) DEVICE — DRAPE,UNDRBUT,WHT GRAD PCH,CAPPORT,20/CS: Brand: MEDLINE

## (undated) DEVICE — STANDARD HYPODERMIC NEEDLE,POLYPROPYLENE HUB: Brand: MONOJECT

## (undated) DEVICE — DRAPE SHEET LAPCHOLE 124X100X7

## (undated) DEVICE — 20 ML SYRINGE LUER-LOCK TIP: Brand: MONOJECT

## (undated) DEVICE — ENCORE® LATEX ACCLAIM SIZE 8, STERILE LATEX POWDER-FREE SURGICAL GLOVE: Brand: ENCORE

## (undated) DEVICE — 3M™ STERI-STRIP™ REINFORCED ADHESIVE SKIN CLOSURES, R1547, 1/2 IN X 4 IN (12 MM X 100 MM), 6 STRIPS/ENVELOPE: Brand: 3M™ STERI-STRIP™

## (undated) NOTE — Clinical Note
TCM call completed. A TE was sent to the office for an appointment request. The patient reports doing well since discharge. Thank you.